# Patient Record
Sex: MALE | Race: WHITE | NOT HISPANIC OR LATINO | Employment: FULL TIME | URBAN - METROPOLITAN AREA
[De-identification: names, ages, dates, MRNs, and addresses within clinical notes are randomized per-mention and may not be internally consistent; named-entity substitution may affect disease eponyms.]

---

## 2017-03-07 DIAGNOSIS — F32.0 MAJOR DEPRESSIVE DISORDER, SINGLE EPISODE, MILD (H): ICD-10-CM

## 2017-03-07 RX ORDER — CITALOPRAM HYDROBROMIDE 20 MG/1
20 TABLET ORAL DAILY
Qty: 90 TABLET | Refills: 0 | Status: CANCELLED | OUTPATIENT
Start: 2017-03-07

## 2017-03-07 NOTE — TELEPHONE ENCOUNTER
citalopram (CELEXA) 20 MG tablet         Last Written Prescription Date: 07/19/16  Last Fill Quantity: 90, # refills: 0  [place] [facility] [place]:  03/14/2016       Last PHQ-9 score on record=   PHQ-9 SCORE 7/19/2016   Total Score -   Total Score 2

## 2017-03-09 NOTE — TELEPHONE ENCOUNTER
Pt takes celexa for depression. Is also on Wellbutrin  mg qd. Pt should've ran out of celexa in 10/2016. Not sure whether pt is taking med continuously. Called pharmacy, last dispense on celexa was 11/20/16 for 90#, no more refill left.  weaned him off from Celexa, but wanted to restart celexa,  agreed, advised him to make a f/u appointment in a month. Pt never made that f/u appointment. Pt is over due for depression f/u. Will call pt tomorrow to make an appointment & update PHQ.     Pt wanted to restart celexa - notes from 07/19/16:  Please call:  OK to restart citalopram. Rx sent to [facility].  I recommend that he see me in about 1 month for follow-up.     Notes from 01/13/16:  Try weaning off citalopram (Celexa)  For the next week, alternate 1 tablet with 1/2 tablet  The following week, 1/2 tablet once per day  The following week, 1/2 tablet every other day then stop      If your mood worsens over the next several weeks, please let me know     Jackie, RN  Triage Nurse

## 2017-03-09 NOTE — TELEPHONE ENCOUNTER
Called pt at [phone], advised that he need to be seen for depression f/u, offered an appointment. Pt says that he can't afford an OV at this time. Unable to update PHQ over the phone, he was outside. Made phone visit with  for tomorrow to f/u on depression meds & update PHQ.     Jackie, RN  Triage Nurse

## 2017-03-10 ENCOUNTER — VIRTUAL VISIT (OUTPATIENT)
Dept: PEDIATRICS | Facility: CLINIC | Age: 37
End: 2017-03-10
Payer: COMMERCIAL

## 2017-03-10 DIAGNOSIS — I10 ESSENTIAL HYPERTENSION, BENIGN: ICD-10-CM

## 2017-03-10 DIAGNOSIS — F32.0 MAJOR DEPRESSIVE DISORDER, SINGLE EPISODE, MILD (H): ICD-10-CM

## 2017-03-10 PROCEDURE — 99441 ZZC PHYSICIAN TELEPHONE EVALUATION 5-10 MIN: CPT | Performed by: INTERNAL MEDICINE

## 2017-03-10 RX ORDER — LISINOPRIL 20 MG/1
20 TABLET ORAL DAILY
Qty: 90 TABLET | Refills: 1 | Status: SHIPPED | OUTPATIENT
Start: 2017-03-10 | End: 2017-12-13

## 2017-03-10 RX ORDER — CITALOPRAM HYDROBROMIDE 20 MG/1
20 TABLET ORAL DAILY
Qty: 90 TABLET | Refills: 3 | Status: SHIPPED | OUTPATIENT
Start: 2017-03-10 | End: 2017-12-13

## 2017-03-10 RX ORDER — BUPROPION HYDROCHLORIDE 300 MG/1
300 TABLET ORAL EVERY MORNING
Qty: 90 TABLET | Refills: 3 | Status: SHIPPED | OUTPATIENT
Start: 2017-03-10 | End: 2017-12-13

## 2017-03-10 NOTE — PROGRESS NOTES
CC: Depression, HTN      HPI: Telephone visit today for Octavio Pereira for discussion of depression.    Has been taking citalopram and bupropion.  Feels that the medications are working well.  No significant side effects noted.    PHQ-9 score today of 1.    Also has HTN.  BP Readings from Last 3 Encounters:   10/20/16 120/80   03/16/16 134/72   03/14/16 130/80     No cardiac sx such as CP, palpitations, PND, orthopnea, ALBA or peripheral edema.     Patient Active Problem List   Diagnosis     GYNECOMASTIA     Hypertension     Mild Depression     CARDIOVASCULAR SCREENING; LDL GOAL LESS THAN 160     Blunt injury, left eye     JOSÉ MIGUEL (obstructive sleep apnea)     S/P cervical discectomy       Past Medical History   Diagnosis Date     BENIGN HYPERTENSION 6/6/2007     DEPRESSIVE DISORDER NEC 6/6/2007     Sleep apnea        Past Surgical History   Procedure Laterality Date     Surgical history of -        Thumb tendon repair     Orthopedic surgery       thumb tendon repair     Foraminotomy cervical posterior minimally invasive one level Left 3/15/2016     Procedure: FORAMINOTOMY CERVICAL POSTERIOR MINIMALLY INVASIVE ONE LEVEL;  Surgeon: Reza Garvin MD;  Location:  OR       Current Outpatient Prescriptions   Medication Sig Dispense Refill     lisinopril (PRINIVIL,ZESTRIL) 20 MG tablet Take 1 tablet (20 mg) by mouth daily 90 tablet 1     citalopram (CELEXA) 20 MG tablet Take 1 tablet (20 mg) by mouth daily 90 tablet 0     buPROPion (WELLBUTRIN XL) 300 MG 24 hr tablet Take 1 tablet (300 mg) by mouth every morning 90 tablet 3       ALLERGIES:  Allergies   Allergen Reactions     Sulfa Drugs Rash       EXAM  Not done d/t phone visit     ASSESSMENT:    ICD-10-CM    1. Major depressive disorder, single episode, mild (H) F32.0 citalopram (CELEXA) 20 MG tablet     buPROPion (WELLBUTRIN XL) 300 MG 24 hr tablet   2. BENIGN HYPERTENSION I10 lisinopril (PRINIVIL/ZESTRIL) 20 MG tablet     Continue w/ current medications.  Follow-up in  clinic this fall for BP recheck.     Total phone time: 5 minutes 20 seconds

## 2017-03-10 NOTE — MR AVS SNAPSHOT
"              After Visit Summary   3/10/2017    Octavio Pereira    MRN: [id number]           Patient Information     Date Of Birth          1980        Visit Information        Provider Department      3/10/2017 2:00 PM Francis Morales MD [facility] [place]        Today's Diagnoses     Major depressive disorder, single episode, mild (H)        BENIGN HYPERTENSION           Follow-ups after your visit        Who to contact     If you have questions or need follow up information about today's clinic visit or your schedule please contact [facility] directly at [phone].  Normal or non-critical lab and imaging results will be communicated to you by [employer], letter or phone within 4 business days after the clinic has received the results. If you do not hear from us within 7 days, please contact the clinic through [employer] or phone. If you have a critical or abnormal lab result, we will notify you by phone as soon as possible.  Submit refill requests through [employer] or call your pharmacy and they will forward the refill request to us. Please allow 3 business days for your refill to be completed.          Additional Information About Your Visit        MyChart Information     [employer] lets you send messages to your doctor, view your test results, renew your prescriptions, schedule appointments and more. To sign up, go to [email or web address] . Click on \"Log in\" on the left side of the screen, which will take you to the Welcome page. Then click on \"Sign up Now\" on the right side of the page.     You will be asked to enter the access code listed below, as well as some personal information. Please follow the directions to create your username and password.     Your access code is: [email or web address]  Expires: 2017  7:40 AM     Your access code will  in 90 days. If you need help or a new code, please call your [facility] or [phone].        Care EveryWhere ID     This is your Care " EveryWhere ID. This could be used by other organizations to access your [facility] medical records  [id number]         Blood Pressure from Last 3 Encounters:   10/20/16 120/80   03/16/16 134/72   03/14/16 130/80    Weight from Last 3 Encounters:   10/20/16 228 lb 6.4 oz (103.6 kg)   03/15/16 227 lb (103 kg)   03/14/16 228 lb (103.4 kg)              Today, you had the following     No orders found for display         Where to get your medicines      These medications were sent to [id number] - [place], [place] - [address]  [address], [facility] [address]     Phone:  [phone]     buPROPion 300 MG 24 hr tablet    citalopram 20 MG tablet    lisinopril 20 MG tablet          Primary Care Provider Office Phone # Fax #    Francis Morales [phone]       [facility] [address] [place] [address]        Thank you!     Thank you for choosing [facility]  for your care. Our goal is always to provide you with excellent care. Hearing back from our patients is one way we can continue to improve our services. Please take a few minutes to complete the written survey that you may receive in the mail after your visit with us. Thank you!             Your Updated Medication List - Protect others around you: Learn how to safely use, store and throw away your medicines at www.disposemymeds.org.          This list is accurate as of: 3/10/17 11:59 PM.  Always use your most recent med list.                   Brand Name Dispense Instructions for use    buPROPion 300 MG 24 hr tablet    WELLBUTRIN XL    90 tablet    Take 1 tablet (300 mg) by mouth every morning       citalopram 20 MG tablet    celeXA    90 tablet    Take 1 tablet (20 mg) by mouth daily       lisinopril 20 MG tablet    PRINIVIL/ZESTRIL    90 tablet    Take 1 tablet (20 mg) by mouth daily

## 2017-03-11 ASSESSMENT — PATIENT HEALTH QUESTIONNAIRE - PHQ9: SUM OF ALL RESPONSES TO PHQ QUESTIONS 1-9: 1

## 2017-10-15 ENCOUNTER — OFFICE VISIT (OUTPATIENT)
Dept: URGENT CARE | Facility: URGENT CARE | Age: 37
End: 2017-10-15
Payer: COMMERCIAL

## 2017-10-15 VITALS
OXYGEN SATURATION: 98 % | TEMPERATURE: 97.8 F | HEART RATE: 90 BPM | DIASTOLIC BLOOD PRESSURE: 60 MMHG | SYSTOLIC BLOOD PRESSURE: 110 MMHG

## 2017-10-15 DIAGNOSIS — J20.9 ACUTE BRONCHITIS WITH COEXISTING CONDITION REQUIRING PROPHYLACTIC TREATMENT: ICD-10-CM

## 2017-10-15 DIAGNOSIS — J11.1 INFLUENZA-LIKE ILLNESS: Primary | ICD-10-CM

## 2017-10-15 PROCEDURE — 99213 OFFICE O/P EST LOW 20 MIN: CPT | Performed by: FAMILY MEDICINE

## 2017-10-15 RX ORDER — DOXYCYCLINE 100 MG/1
100 CAPSULE ORAL 2 TIMES DAILY
Qty: 20 CAPSULE | Refills: 0 | Status: SHIPPED | OUTPATIENT
Start: 2017-10-15 | End: 2017-10-25

## 2017-10-15 NOTE — NURSING NOTE
"Chief Complaint   Patient presents with     Urgent Care     URI     Dx maikel-influencia on 10/10 at [facility]-  Cough, Cx congestion, ratllinng noise on Cx        Initial /60 (BP Location: Right arm, Patient Position: Chair, Cuff Size: Adult Large)  Pulse 90  Temp 97.8  F (36.6  C) (Oral)  SpO2 98% Estimated body mass index is 27.08 kg/(m^2) as calculated from the following:    Height as of 10/20/16: 6' 5\" (1.956 m).    Weight as of 10/20/16: 228 lb 6.4 oz (103.6 kg).  Medication Reconciliation: complete     Lyudmila Mckenzie CMA (AAMA)        "

## 2017-10-15 NOTE — PATIENT INSTRUCTIONS
Acute Bronchitis  Your healthcare provider has told you that you have acute bronchitis. Bronchitis is infection or inflammation of the bronchial tubes (airways in the lungs). Normally, air moves easily in and out of the airways. Bronchitis narrows the airways, making it harder for air to flow in and out of the lungs. This causes symptoms such as shortness of breath, coughing up yellow or green mucus, and wheezing. Bronchitis can be acute or chronic. Acute means the condition comes on quickly and goes away in a short time, usually within 3 to 10 days. Chronic means a condition lasts a long time and often comes back.    What causes acute bronchitis?  Acute bronchitis almost always starts as a viral respiratory infection, such as a cold or the flu. Certain factors make it more likely for a cold or flu to turn into bronchitis. These include being very young, being elderly, having a heart or lung problem, or having a weak immune system. Cigarette smoking also makes bronchitis more likely.  When bronchitis develops, the airways become swollen. The airways may also become infected with bacteria. This is known as a secondary infection.  Diagnosing acute bronchitis  Your healthcare provider will examine you and ask about your symptoms and health history. You may also have a sputum culture to test the fluid in your lungs. Chest X-rays may be done to look for infection in the lungs.  Treating acute bronchitis  Bronchitis usually clears up as the cold or flu goes away. You can help feel better faster by doing the following:    Take medicine as directed. You may be told to take ibuprofen or other over-the-counter medicines. These help relieve inflammation in your bronchial tubes. Your healthcare provider may prescribe an inhaler to help open up the bronchial tubes. Most of the time, acute bronchitis is caused by a viral infection. Antibiotics are usually not prescribed for viral infections.    Drink plenty of fluids, such as  water, juice, or warm soup. Fluids loosen mucus so that you can cough it up. This helps you breathe more easily. Fluids also prevent dehydration.    Make sure you get plenty of rest.    Do not smoke. Do not allow anyone else to smoke in your home.  Recovery and follow-up  Follow up with your doctor as you are told. You will likely feel better in a week or two. But a dry cough can linger beyond that time. Let your doctor know if you still have symptoms (other than a dry cough) after 2 weeks, or if you re prone to getting bronchial infections. Take steps to protect yourself from future infections. These steps include stopping smoking and avoiding tobacco smoke, washing your hands often, and getting a yearly flu shot.  When to call your healthcare provider  Call the healthcare provider if you have any of the following:    Fever of 100.4 F (38.0 C) or higher, or as advised    Symptoms that get worse, or new symptoms    Trouble breathing    Symptoms that don t start to improve within a week, or within 3 days of taking antibiotics   Date Last Reviewed: 12/1/2016 2000-2017 [employer]. [address] [place] [address]. All rights reserved. This information is not intended as a substitute for professional medical care. Always follow your healthcare professional's instructions.        The Flu (Influenza)     The virus that causes the flu spreads through the air in droplets when someone who has the flu coughs, sneezes, laughs, or talks.   The flu (influenza) is an infection that affects your respiratory tract. This tract is made up of your mouth, nose, and lungs, and the passages between them. Unlike a cold, the flu can make you very ill. And it can lead to pneumonia, a serious lung infection. The flu can have serious complications and even cause death.  Who is at risk for the flu?  Anyone can get the flu. But you are more likely to become infected if you:    Have a weakened immune system    Work in  a healthcare setting where you may be exposed to flu germs    Live or work with someone who has the flu    Haven t had an annual flu shot  How does the flu spread?  The flu is caused by a virus. The virus spreads through the air in droplets when someone who has the flu coughs, sneezes, laughs, or talks. You can become infected when you inhale these viruses directly. You can also become infected when you touch a surface on which the droplets have landed and then transfer the germs to your eyes, nose, or mouth. Touching used tissues, or sharing utensils, drinking glasses, or a toothbrush from an infected person can expose you to flu viruses, too.  What are the symptoms of the flu?  Flu symptoms tend to come on quickly and may last a few days to a few weeks. They include:    Fever usually higher than 100.4 F  (38 C) and chills    Sore throat and headache    Dry cough    Runny nose    Tiredness and weakness    Muscle aches  Who is at risk for flu complications?  For some people, the flu can be very serious. The risk for complications is greater for:    Children younger than age 5    Adults ages 65 and older    People with a chronic illness such as diabetes or heart, kidney, or lung disease    People who live in a nursing home or long-term care facility   How is the flu treated?  The flu usually gets better after 7 days or so. In some cases, your healthcare provider may prescribe an antiviral medicine. This may help you get well a little sooner. For the medicine to help, you need to take it as soon as possible (ideally within 48 hours) after your symptoms start. If you develop pneumonia or other serious illness, you may need to stay in the hospital.  Easing flu symptoms    Drink lots of fluids such as water, juice, and warm soup. A good rule is to drink enough so that you urinate your normal amount.    Get plenty of rest.    Ask your healthcare provider what to take for fever and pain.    Call your provider if your fever  is 100.4 F (38 C) or higher, or you become dizzy, lightheaded, or short of breath.  Taking steps to protect others    Wash your hands often, especially after coughing or sneezing. Or clean your hands with an alcohol-based hand  containing at least 60% alcohol.    Cough or sneeze into a tissue. Then throw the tissue away and wash your hands. If you don t have a tissue, cough and sneeze into your elbow.    Stay home until at least 24 hours after you no longer have a fever or chills. Be sure the fever isn t being hidden by fever-reducing medicine.    Don t share food, utensils, drinking glasses, or a toothbrush with others.    Ask your healthcare provider if others in your household should get antiviral medicine to help them avoid infection.  How can the flu be prevented?    One of the best ways to avoid the flu is to get a flu vaccine each year. The virus that causes the flu changes from year to year. For that reason, healthcare providers recommend getting the flu vaccine each year, as soon as it's available in your area. The vaccine is given as a shot. Your healthcare provider can tell you which vaccine is right for you. A nasal spray is also available but is not recommended for the 1086-6806 flu season. The CDC says this is because the nasal spray did not seem to protect against the flu over the last several flu seasons. In the past, it was meant for people ages 2 to 49.    Wash your hands often. Frequent handwashing is a proven way to help prevent infection.    Carry an alcohol-based hand gel containing at least 60% alcohol. Use it when you can't use soap and water. Then wash your hands as soon as you can.    Avoid touching your eyes, nose, and mouth.    At home and work, clean phones, computer keyboards, and toys often with disinfectant wipes.    If possible, avoid close contact with others who have the flu or symptoms of the flu.  Handwashing tips  Handwashing is one of the best ways to prevent many common  infections. If you are caring for or visiting someone with the flu, wash your hands each time you enter and leave the room. Follow these steps:    Use warm water and plenty of soap. Rub your hands together well.    Clean the whole hand, including under your nails, between your fingers, and up the wrists.    Wash for at least 15 seconds.    Rinse, letting the water run down your fingers, not up your wrists.    Dry your hands well. Use a paper towel to turn off the faucet and open the door.  Using alcohol-based hand   Alcohol-based hand  are also a good choice. Use them when you can't use soap and water. Follow these steps:    Squeeze about a tablespoon of gel into the palm of one hand.    Rub your hands together briskly, cleaning the backs of your hands, the palms, between your fingers, and up the wrists.    Rub until the gel is gone and your hands are completely dry.  Preventing the flu in healthcare settings  The flu is a special concern for people in hospitals and long-term care facilities. To help prevent the spread of flu, many hospitals and nursing homes take these steps:    Healthcare providers wash their hands or use an alcohol-based hand  before and after treating each patient.    People with the flu have private rooms and bathrooms or share a room with someone with the same infection.    People who are at high risk for the flu but don't have it are encouraged to get the flu and pneumonia vaccines.    All healthcare workers are encouraged or required to get flu shots.   Date Last Reviewed: 12/1/2016 2000-2017 [employer]. [address] [place] [address], [place] [address]. All rights reserved. This information is not intended as a substitute for professional medical care. Always follow your healthcare professional's instructions.

## 2017-10-15 NOTE — MR AVS SNAPSHOT
After Visit Summary   10/15/2017    Octavio Pereira    MRN: [id number]           Patient Information     Date Of Birth          1980        Visit Information        Provider Department      10/15/2017 10:25 AM Izabela Phillips MD [facility]        Today's Diagnoses     Influenza-like illness    -  1    Acute bronchitis with coexisting condition requiring prophylactic treatment          Care Instructions      Acute Bronchitis  Your healthcare provider has told you that you have acute bronchitis. Bronchitis is infection or inflammation of the bronchial tubes (airways in the lungs). Normally, air moves easily in and out of the airways. Bronchitis narrows the airways, making it harder for air to flow in and out of the lungs. This causes symptoms such as shortness of breath, coughing up yellow or green mucus, and wheezing. Bronchitis can be acute or chronic. Acute means the condition comes on quickly and goes away in a short time, usually within 3 to 10 days. Chronic means a condition lasts a long time and often comes back.    What causes acute bronchitis?  Acute bronchitis almost always starts as a viral respiratory infection, such as a cold or the flu. Certain factors make it more likely for a cold or flu to turn into bronchitis. These include being very young, being elderly, having a heart or lung problem, or having a weak immune system. Cigarette smoking also makes bronchitis more likely.  When bronchitis develops, the airways become swollen. The airways may also become infected with bacteria. This is known as a secondary infection.  Diagnosing acute bronchitis  Your healthcare provider will examine you and ask about your symptoms and health history. You may also have a sputum culture to test the fluid in your lungs. Chest X-rays may be done to look for infection in the lungs.  Treating acute bronchitis  Bronchitis usually clears up as the cold or flu goes away. You can help  feel better faster by doing the following:    Take medicine as directed. You may be told to take ibuprofen or other over-the-counter medicines. These help relieve inflammation in your bronchial tubes. Your healthcare provider may prescribe an inhaler to help open up the bronchial tubes. Most of the time, acute bronchitis is caused by a viral infection. Antibiotics are usually not prescribed for viral infections.    Drink plenty of fluids, such as water, juice, or warm soup. Fluids loosen mucus so that you can cough it up. This helps you breathe more easily. Fluids also prevent dehydration.    Make sure you get plenty of rest.    Do not smoke. Do not allow anyone else to smoke in your home.  Recovery and follow-up  Follow up with your doctor as you are told. You will likely feel better in a week or two. But a dry cough can linger beyond that time. Let your doctor know if you still have symptoms (other than a dry cough) after 2 weeks, or if you re prone to getting bronchial infections. Take steps to protect yourself from future infections. These steps include stopping smoking and avoiding tobacco smoke, washing your hands often, and getting a yearly flu shot.  When to call your healthcare provider  Call the healthcare provider if you have any of the following:    Fever of 100.4 F (38.0 C) or higher, or as advised    Symptoms that get worse, or new symptoms    Trouble breathing    Symptoms that don t start to improve within a week, or within 3 days of taking antibiotics   Date Last Reviewed: 12/1/2016 2000-2017 [employer]. [address] [place] [address], [place] [address]. All rights reserved. This information is not intended as a substitute for professional medical care. Always follow your healthcare professional's instructions.        The Flu (Influenza)     The virus that causes the flu spreads through the air in droplets when someone who has the flu coughs, sneezes, laughs, or talks.   The flu (influenza)  is an infection that affects your respiratory tract. This tract is made up of your mouth, nose, and lungs, and the passages between them. Unlike a cold, the flu can make you very ill. And it can lead to pneumonia, a serious lung infection. The flu can have serious complications and even cause death.  Who is at risk for the flu?  Anyone can get the flu. But you are more likely to become infected if you:    Have a weakened immune system    Work in a healthcare setting where you may be exposed to flu germs    Live or work with someone who has the flu    Haven t had an annual flu shot  How does the flu spread?  The flu is caused by a virus. The virus spreads through the air in droplets when someone who has the flu coughs, sneezes, laughs, or talks. You can become infected when you inhale these viruses directly. You can also become infected when you touch a surface on which the droplets have landed and then transfer the germs to your eyes, nose, or mouth. Touching used tissues, or sharing utensils, drinking glasses, or a toothbrush from an infected person can expose you to flu viruses, too.  What are the symptoms of the flu?  Flu symptoms tend to come on quickly and may last a few days to a few weeks. They include:    Fever usually higher than 100.4 F  (38 C) and chills    Sore throat and headache    Dry cough    Runny nose    Tiredness and weakness    Muscle aches  Who is at risk for flu complications?  For some people, the flu can be very serious. The risk for complications is greater for:    Children younger than age 5    Adults ages 65 and older    People with a chronic illness such as diabetes or heart, kidney, or lung disease    People who live in a nursing home or long-term care facility   How is the flu treated?  The flu usually gets better after 7 days or so. In some cases, your healthcare provider may prescribe an antiviral medicine. This may help you get well a little sooner. For the medicine to help, you need  to take it as soon as possible (ideally within 48 hours) after your symptoms start. If you develop pneumonia or other serious illness, you may need to stay in the hospital.  Easing flu symptoms    Drink lots of fluids such as water, juice, and warm soup. A good rule is to drink enough so that you urinate your normal amount.    Get plenty of rest.    Ask your healthcare provider what to take for fever and pain.    Call your provider if your fever is 100.4 F (38 C) or higher, or you become dizzy, lightheaded, or short of breath.  Taking steps to protect others    Wash your hands often, especially after coughing or sneezing. Or clean your hands with an alcohol-based hand  containing at least 60% alcohol.    Cough or sneeze into a tissue. Then throw the tissue away and wash your hands. If you don t have a tissue, cough and sneeze into your elbow.    Stay home until at least 24 hours after you no longer have a fever or chills. Be sure the fever isn t being hidden by fever-reducing medicine.    Don t share food, utensils, drinking glasses, or a toothbrush with others.    Ask your healthcare provider if others in your household should get antiviral medicine to help them avoid infection.  How can the flu be prevented?    One of the best ways to avoid the flu is to get a flu vaccine each year. The virus that causes the flu changes from year to year. For that reason, healthcare providers recommend getting the flu vaccine each year, as soon as it's available in your area. The vaccine is given as a shot. Your healthcare provider can tell you which vaccine is right for you. A nasal spray is also available but is not recommended for the 1555-3637 flu season. The CDC says this is because the nasal spray did not seem to protect against the flu over the last several flu seasons. In the past, it was meant for people ages 2 to 49.    Wash your hands often. Frequent handwashing is a proven way to help prevent infection.    Carry  an alcohol-based hand gel containing at least 60% alcohol. Use it when you can't use soap and water. Then wash your hands as soon as you can.    Avoid touching your eyes, nose, and mouth.    At home and work, clean phones, computer keyboards, and toys often with disinfectant wipes.    If possible, avoid close contact with others who have the flu or symptoms of the flu.  Handwashing tips  Handwashing is one of the best ways to prevent many common infections. If you are caring for or visiting someone with the flu, wash your hands each time you enter and leave the room. Follow these steps:    Use warm water and plenty of soap. Rub your hands together well.    Clean the whole hand, including under your nails, between your fingers, and up the wrists.    Wash for at least 15 seconds.    Rinse, letting the water run down your fingers, not up your wrists.    Dry your hands well. Use a paper towel to turn off the faucet and open the door.  Using alcohol-based hand   Alcohol-based hand  are also a good choice. Use them when you can't use soap and water. Follow these steps:    Squeeze about a tablespoon of gel into the palm of one hand.    Rub your hands together briskly, cleaning the backs of your hands, the palms, between your fingers, and up the wrists.    Rub until the gel is gone and your hands are completely dry.  Preventing the flu in healthcare settings  The flu is a special concern for people in hospitals and long-term care facilities. To help prevent the spread of flu, many hospitals and nursing homes take these steps:    Healthcare providers wash their hands or use an alcohol-based hand  before and after treating each patient.    People with the flu have private rooms and bathrooms or share a room with someone with the same infection.    People who are at high risk for the flu but don't have it are encouraged to get the flu and pneumonia vaccines.    All healthcare workers are encouraged or  "required to get flu shots.   Date Last Reviewed: 2016-2017 [employer], "[employer]. [address] [place] [address], [place] [address]. All rights reserved. This information is not intended as a substitute for professional medical care. Always follow your healthcare professional's instructions.                Follow-ups after your visit        Who to contact     If you have questions or need follow up information about today's clinic visit or your schedule please contact [facility] directly at [phone].  Normal or non-critical lab and imaging results will be communicated to you by BitInstanthart, letter or phone within 4 business days after the clinic has received the results. If you do not hear from us within 7 days, please contact the clinic through RV IDt or phone. If you have a critical or abnormal lab result, we will notify you by phone as soon as possible.  Submit refill requests through "[employer]" or call your pharmacy and they will forward the refill request to us. Please allow 3 business days for your refill to be completed.          Additional Information About Your Visit        BitInstantharMTX Connect Information     "[employer]" lets you send messages to your doctor, view your test results, renew your prescriptions, schedule appointments and more. To sign up, go to [email or web address]/"[employer]" . Click on \"Log in\" on the left side of the screen, which will take you to the Welcome page. Then click on \"Sign up Now\" on the right side of the page.     You will be asked to enter the access code listed below, as well as some personal information. Please follow the directions to create your username and password.     Your access code is: [email or web address]  Expires: 2018 11:21 AM     Your access code will  in 90 days. If you need help or a new code, please call your [facility] or [phone].        Care EveryWhere ID     This is your Care EveryWhere ID. This could be used by other organizations to access " your [place] medical records  [id number]        Your Vitals Were     Pulse Temperature Pulse Oximetry             90 97.8  F (36.6  C) (Oral) 98%          Blood Pressure from Last 3 Encounters:   10/15/17 110/60   10/20/16 120/80   03/16/16 134/72    Weight from Last 3 Encounters:   10/20/16 228 lb 6.4 oz (103.6 kg)   03/15/16 227 lb (103 kg)   03/14/16 228 lb (103.4 kg)              Today, you had the following     No orders found for display         Today's Medication Changes          These changes are accurate as of: 10/15/17 11:21 AM.  If you have any questions, ask your nurse or doctor.               Start taking these medicines.        Dose/Directions    doxycycline 100 MG capsule   Commonly known as:  VIBRAMYCIN   Used for:  Acute bronchitis with coexisting condition requiring prophylactic treatment, Influenza-like illness   Started by:  Izabela Phillips MD        Dose:  100 mg   Take 1 capsule (100 mg) by mouth 2 times daily for 10 days   Quantity:  20 capsule   Refills:  0            Where to get your medicines      These medications were sent to [id number] - [place], [place] - [address]  [address], [facility] [address]     Phone:  [phone]     doxycycline 100 MG capsule                Primary Care Provider Office Phone # Fax #    Francis Morales [phone] [address] [place] [address]        Equal Access to Services     [facility] AH: Hadii mayela solo hadasho Socandidaali, waaxda luqadaha, qaybta kaalmada adeegyada, waxángel herb chapin. So [facility] [phone].    ATENCIÓN: Si habla español, tiene a domínguez disposición servicios gratuitos de asistencia lingüística. Llame al [phone].    We comply with applicable federal civil rights laws and [place] laws. We do not discriminate on the basis of race, color, national origin, age, disability, sex, sexual orientation, or gender identity.            Thank you!     Thank you for  choosing [facility]RG[facility]  for your care. Our goal is always to provide you with excellent care. Hearing back from our patients is one way we can continue to improve our services. Please take a few minutes to complete the written survey that you may receive in the mail after your visit with us. Thank you!             Your Updated Medication List - Protect others around you: Learn how to safely use, store and throw away your medicines at www.disposemymeds.org.          This list is accurate as of: 10/15/17 11:21 AM.  Always use your most recent med list.                   Brand Name Dispense Instructions for use Diagnosis    buPROPion 300 MG 24 hr tablet    WELLBUTRIN XL    90 tablet    Take 1 tablet (300 mg) by mouth every morning    Major depressive disorder, single episode, mild (H)       citalopram 20 MG tablet    celeXA    90 tablet    Take 1 tablet (20 mg) by mouth daily    Major depressive disorder, single episode, mild (H)       doxycycline 100 MG capsule    VIBRAMYCIN    20 capsule    Take 1 capsule (100 mg) by mouth 2 times daily for 10 days    Acute bronchitis with coexisting condition requiring prophylactic treatment, Influenza-like illness       lisinopril 20 MG tablet    PRINIVIL/ZESTRIL    90 tablet    Take 1 tablet (20 mg) by mouth daily    Essential hypertension, benign

## 2017-10-15 NOTE — PROGRESS NOTES
SUBJECTIVE:   Chief Complaint   Patient presents with     Urgent Care     URI     [facility]  Cough, Cx congestion, ratllinng noise on Cx      Octavio Pereira is a 37 year old male who present complaining of flu-like symptoms: fevers, chills, myalgias, headache,  congestion, sore throat and cough for 5 days.  He has been home in bed with alternating fevers/ chills.    Reports some  dyspnea /  no wheezing.  Has no known exposure to people with influenza.  The fevers stopped 2 days ago, but now he has worse cough, lung burning sensation, fatigue, no wheezing-  Concerned about lung infection      Past Medical History:   Diagnosis Date     BENIGN HYPERTENSION 6/6/2007     DEPRESSIVE DISORDER NEC 6/6/2007     Sleep apnea        ALLERGIES:  Sulfa drugs        Current Outpatient Prescriptions on File Prior to Visit:  citalopram (CELEXA) 20 MG tablet Take 1 tablet (20 mg) by mouth daily   buPROPion (WELLBUTRIN XL) 300 MG 24 hr tablet Take 1 tablet (300 mg) by mouth every morning   lisinopril (PRINIVIL/ZESTRIL) 20 MG tablet Take 1 tablet (20 mg) by mouth daily     No current facility-administered medications on file prior to visit.     Social History   Substance Use Topics     Smoking status: Never Smoker     Smokeless tobacco: Never Used     Alcohol use 0.0 oz/week     0 Standard drinks or equivalent per week      Comment: socially       Family History   Problem Relation Age of Onset     C.A.D. Father      C.A.D. Maternal Grandmother      DIABETES Father      Depression Father          ROS:  INTEGUMENTARY/SKIN: NEGATIVE for worrisome rashes, moles or lesions  EYES: NEGATIVE for vision changes or irritation  ENT/MOUTH: NEGATIVE for ear, mouth and throat problems  GI: NEGATIVE for nausea, abdominal pain, heartburn, or change in bowel habits     OBJECTIVE  :/60 (BP Location: Right arm, Patient Position: Chair, Cuff Size: Adult Large)  Pulse 90  Temp 97.8  F (36.6  C) (Oral)  SpO2  98%  GENERAL APPEARANCE: alert, moderate distress, flushed and fatigued,  occasional cough  EYES: EOMI,  PERRL, conjunctiva clear  HENT: ear canals and TM's normal.  Nose and mouth without ulcers, erythema or lesions  NECK: supple, nontender, no lymphadenopathy  RESP: lungs clear to auscultation - no rales, rhonchi or wheezes  CV: regular rates and rhythm, normal S1 S2, no murmur noted  NEURO: Normal strength and tone, sensory exam grossly normal,  normal speech and mentation  SKIN: no suspicious lesions or rashes     ASSESSMENT:  Influenza-like illness     - doxycycline (VIBRAMYCIN) 100 MG capsule; Take 1 capsule (100 mg) by mouth 2 times daily for 10 days    Acute bronchitis with coexisting condition requiring prophylactic treatment     - doxycycline (VIBRAMYCIN) 100 MG capsule; Take 1 capsule (100 mg) by mouth 2 times daily for 10 days        Symptomatic therapy suggested:  Rest, drink lots of fluids,  Acetaminophen / ibuprofen for body aches and fever,  Salt water gargles for sore throat,  OTC anesthetic lozenges for sore throat,  OTC cough medications.   Call or return to clinic prn if these symptoms worsen or fail to improve as anticipated.     Advised that influenza illness usually lasts a week, sometimes more and that the patient should avoid contact with others, and cover the mouth when coughing to limit spread of the infection.    Discussed that influenza is a potent virus that can cause damage to airways making increased risk for developing bronchitis or pneumonia.  In severe cases of chest symptoms and antibiotic can treat the bacterial superinfection, but I explained that the antibiotic is not effective against the influenza virus.

## 2017-12-13 ENCOUNTER — OFFICE VISIT (OUTPATIENT)
Dept: PEDIATRICS | Facility: CLINIC | Age: 37
End: 2017-12-13
Payer: COMMERCIAL

## 2017-12-13 VITALS
WEIGHT: 232.8 LBS | SYSTOLIC BLOOD PRESSURE: 122 MMHG | BODY MASS INDEX: 27.49 KG/M2 | HEART RATE: 86 BPM | OXYGEN SATURATION: 97 % | TEMPERATURE: 98.3 F | HEIGHT: 77 IN | DIASTOLIC BLOOD PRESSURE: 80 MMHG

## 2017-12-13 DIAGNOSIS — F32.0 MAJOR DEPRESSIVE DISORDER, SINGLE EPISODE, MILD (H): ICD-10-CM

## 2017-12-13 DIAGNOSIS — G25.81 RESTLESS LEGS: ICD-10-CM

## 2017-12-13 DIAGNOSIS — Z23 NEED FOR PROPHYLACTIC VACCINATION AND INOCULATION AGAINST INFLUENZA: ICD-10-CM

## 2017-12-13 DIAGNOSIS — I10 ESSENTIAL HYPERTENSION, BENIGN: Primary | ICD-10-CM

## 2017-12-13 DIAGNOSIS — Z23 NEED FOR VACCINATION: ICD-10-CM

## 2017-12-13 LAB
ANION GAP SERPL CALCULATED.3IONS-SCNC: 7 MMOL/L (ref 3–14)
BUN SERPL-MCNC: 22 MG/DL (ref 7–30)
CALCIUM SERPL-MCNC: 8.8 MG/DL (ref 8.5–10.1)
CHLORIDE SERPL-SCNC: 106 MMOL/L (ref 94–109)
CO2 SERPL-SCNC: 26 MMOL/L (ref 20–32)
CREAT SERPL-MCNC: 1.04 MG/DL (ref 0.66–1.25)
FERRITIN SERPL-MCNC: 205 NG/ML (ref 26–388)
GFR SERPL CREATININE-BSD FRML MDRD: 80 ML/MIN/1.7M2
GLUCOSE SERPL-MCNC: 96 MG/DL (ref 70–99)
IRON SERPL-MCNC: 81 UG/DL (ref 35–180)
POTASSIUM SERPL-SCNC: 4 MMOL/L (ref 3.4–5.3)
SODIUM SERPL-SCNC: 139 MMOL/L (ref 133–144)

## 2017-12-13 PROCEDURE — 90686 IIV4 VACC NO PRSV 0.5 ML IM: CPT | Performed by: INTERNAL MEDICINE

## 2017-12-13 PROCEDURE — 80048 BASIC METABOLIC PNL TOTAL CA: CPT | Performed by: INTERNAL MEDICINE

## 2017-12-13 PROCEDURE — 90471 IMMUNIZATION ADMIN: CPT | Performed by: INTERNAL MEDICINE

## 2017-12-13 PROCEDURE — 83540 ASSAY OF IRON: CPT | Performed by: INTERNAL MEDICINE

## 2017-12-13 PROCEDURE — 99214 OFFICE O/P EST MOD 30 MIN: CPT | Mod: 25 | Performed by: INTERNAL MEDICINE

## 2017-12-13 PROCEDURE — 36415 COLL VENOUS BLD VENIPUNCTURE: CPT | Performed by: INTERNAL MEDICINE

## 2017-12-13 PROCEDURE — 82728 ASSAY OF FERRITIN: CPT | Performed by: INTERNAL MEDICINE

## 2017-12-13 RX ORDER — LISINOPRIL 20 MG/1
20 TABLET ORAL DAILY
Qty: 90 TABLET | Refills: 3 | Status: SHIPPED | OUTPATIENT
Start: 2017-12-13 | End: 2018-12-10

## 2017-12-13 RX ORDER — CITALOPRAM HYDROBROMIDE 20 MG/1
20 TABLET ORAL DAILY
Qty: 90 TABLET | Refills: 3 | Status: SHIPPED | OUTPATIENT
Start: 2017-12-13 | End: 2019-02-17

## 2017-12-13 RX ORDER — BUPROPION HYDROCHLORIDE 300 MG/1
300 TABLET ORAL EVERY MORNING
Qty: 90 TABLET | Refills: 3 | Status: SHIPPED | OUTPATIENT
Start: 2017-12-13 | End: 2019-02-17

## 2017-12-13 RX ORDER — ROPINIROLE 0.5 MG/1
0.5 TABLET, FILM COATED ORAL
Qty: 30 TABLET | Refills: 1 | Status: SHIPPED | OUTPATIENT
Start: 2017-12-13 | End: 2019-11-03

## 2017-12-13 ASSESSMENT — PATIENT HEALTH QUESTIONNAIRE - PHQ9: SUM OF ALL RESPONSES TO PHQ QUESTIONS 1-9: 1

## 2017-12-13 NOTE — PATIENT INSTRUCTIONS
Check labwork today   I will contact you with the results    Requip 0.5 mg   Take at bedtime as needed for restless leg symptoms    Next visit in about 1 year

## 2017-12-13 NOTE — PROGRESS NOTES
"  SUBJECTIVE:   Octavio Pereira is a 37 year old male who presents to clinic today for the following health issues:      Hypertension Follow-up      Outpatient blood pressures are not being checked.    Low Salt Diet: not monitoring salt    Depression Follow-up    Status since last visit: Improved     See PHQ-9 for current symptoms.  Other associated symptoms: None    Complicating factors:   Significant life event:  No   Current substance abuse:  None  Anxiety or Panic symptoms:  No    PHQ-9 SCORE 7/19/2016 3/10/2017 12/13/2017   Total Score - - -   Total Score 2 1 1       Concerns of  Restless legs at night-  When have less sleep due to working late nights   Most nights w/o sx.       Amount of exercise or physical activity: 2-3 days/week for an average of 15-30 minutes    Problems taking medications regularly: No    Medication side effects: none    Diet: regular (no restrictions)    Problem list and histories reviewed & adjusted, as indicated.    Labs reviewed in EPIC    Reviewed and updated as needed this visit by clinical staffTobacco  Allergies  Meds  Problems       Reviewed and updated as needed this visit by Provider  Allergies  Meds  Problems         ROS:  Constitutional, HEENT, cardiovascular, pulmonary, gi and gu systems are negative, except as otherwise noted.      OBJECTIVE:   /80  Pulse 86  Temp 98.3  F (36.8  C) (Oral)  Ht 6' 5\" (1.956 m)  Wt 232 lb 12.8 oz (105.6 kg)  SpO2 97%  BMI 27.61 kg/m2  Body mass index is 27.61 kg/(m^2).  GENERAL: healthy, alert and no distress  EYES: Eyes grossly normal to inspection, PERRL and conjunctivae and sclerae normal  HENT: ear canals and TM's normal, nose and mouth without ulcers or lesions  NECK: no adenopathy, no asymmetry, masses, or scars and thyroid normal to palpation  RESP: lungs clear to auscultation - no rales, rhonchi or wheezes  CV: regular rate and rhythm, normal S1 S2, no S3 or S4, no murmur, click or rub, no peripheral edema and " peripheral pulses strong  ABDOMEN: soft, nontender, no hepatosplenomegaly, no masses and bowel sounds normal  MS: no gross musculoskeletal defects noted, no edema  SKIN: no suspicious lesions or rashes  NEURO: Normal strength and tone, mentation intact and speech normal  PSYCH: mentation appears normal, affect normal/bright      ASSESSMENT/PLAN:       ICD-10-CM    1. BENIGN HYPERTENSION I10 lisinopril (PRINIVIL/ZESTRIL) 20 MG tablet     Basic metabolic panel   2. Major depressive disorder, single episode, mild (H) F32.0 citalopram (CELEXA) 20 MG tablet     buPROPion (WELLBUTRIN XL) 300 MG 24 hr tablet   3. Restless legs G25.81 Iron     Ferritin     rOPINIRole (REQUIP) 0.5 MG tablet   4. Need for vaccination Z23 CANCELED: C FLU VAC QUADRIVALENT SPLIT VIRUS 3+YRS IM   5. Need for prophylactic vaccination and inoculation against influenza Z23 FLU VAC, SPLIT VIRUS IM > 3 YO (QUADRIVALENT) [90274]     Vaccine Administration, Initial [19209]     Patient Instructions   Check labwork today   I will contact you with the results    Requip 0.5 mg   Take at bedtime as needed for restless leg symptoms    Next visit in about 1 year    Francis Morales MD  [facility]

## 2017-12-13 NOTE — LETTER
[address] [id number] [phone]   December 13, 2017    Octavio Pereira  [id number] [place] [id number]      Octavio:    Your tests are all complete. The results show:    1. Your metabolic panel, including kidney function, glucose (blood sugar) and electrolytes, is normal.     2. Your iron and your ferritin (iron stores) levels are normal. An iron supplement would likely not help your restless leg symptoms.     Hopefully the Requip helps!      Please feel free to contact me if you have any questions or concerns.      Francis Morales MD      Results for orders placed or performed in visit on 12/13/17   Basic metabolic panel   Result Value Ref Range    Sodium 139 133 - 144 mmol/L    Potassium 4.0 3.4 - 5.3 mmol/L    Chloride 106 94 - 109 mmol/L    Carbon Dioxide 26 20 - 32 mmol/L    Anion Gap 7 3 - 14 mmol/L    Glucose 96 70 - 99 mg/dL    Urea Nitrogen 22 7 - 30 mg/dL    Creatinine 1.04 0.66 - 1.25 mg/dL    GFR Estimate 80 >60 mL/min/1.7m2    GFR Estimate If Black >90 >60 mL/min/1.7m2    Calcium 8.8 8.5 - 10.1 mg/dL   Iron   Result Value Ref Range    Iron 81 35 - 180 ug/dL   Ferritin   Result Value Ref Range    Ferritin 205 26 - 388 ng/mL

## 2017-12-13 NOTE — NURSING NOTE
"Chief Complaint   Patient presents with     Medication Reconciliation       Initial /80  Pulse 86  Temp 98.3  F (36.8  C) (Oral)  Ht 6' 5\" (1.956 m)  Wt 232 lb 12.8 oz (105.6 kg)  SpO2 97%  BMI 27.61 kg/m2 Estimated body mass index is 27.61 kg/(m^2) as calculated from the following:    Height as of this encounter: 6' 5\" (1.956 m).    Weight as of this encounter: 232 lb 12.8 oz (105.6 kg).  Medication Reconciliation: complete   Holly Rivera MA// December 13, 2017 9:39 AM          "

## 2017-12-13 NOTE — PROGRESS NOTES
Injectable Influenza Immunization Documentation    1.  Is the person to be vaccinated sick today?   No    2. Does the person to be vaccinated have an allergy to a component   of the vaccine?   No  Egg Allergy Algorithm Link    3. Has the person to be vaccinated ever had a serious reaction   to influenza vaccine in the past?   No    4. Has the person to be vaccinated ever had Guillain-Barré syndrome?   No    Form completed by Holly Rivera MA// December 13, 2017 10:44 AM

## 2017-12-13 NOTE — MR AVS SNAPSHOT
"              After Visit Summary   12/13/2017    Octavio Pereira    MRN: [id number]           Patient Information     Date Of Birth          1980        Visit Information        Provider Department      12/13/2017 9:40 AM Francis Morales MD [facility]        Today's Diagnoses     BENIGN HYPERTENSION    -  1    Major depressive disorder, single episode, mild (H)        Restless legs          Care Instructions    Check labwork today   I will contact you with the results    Requip 0.5 mg   Take at bedtime as needed for restless leg symptoms    Next visit in about 1 year          Follow-ups after your visit        Who to contact     If you have questions or need follow up information about today's clinic visit or your schedule please contact [facility] directly at [phone].  Normal or non-critical lab and imaging results will be communicated to you by MyChart, letter or phone within 4 business days after the clinic has received the results. If you do not hear from us within 7 days, please contact the clinic through MyChart or phone. If you have a critical or abnormal lab result, we will notify you by phone as soon as possible.  Submit refill requests through "[employer] or call your pharmacy and they will forward the refill request to us. Please allow 3 business days for your refill to be completed.          Additional Information About Your Visit        MyChartheBench Information     "[employer] lets you send messages to your doctor, view your test results, renew your prescriptions, schedule appointments and more. To sign up, go to [email or web address] . Click on \"Log in\" on the left side of the screen, which will take you to the Welcome page. Then click on \"Sign up Now\" on the right side of the page.     You will be asked to enter the access code listed below, as well as some personal information. Please follow the directions to create your username and password.     Your access code is: " [email or web address]  Expires: 2018 10:21 AM     Your access code will  in 90 days. If you need help or a new code, please call your [facility] clinic or [phone].        Care EveryWhere ID     This is your Care EveryWhere ID. This could be used by other organizations to access your [facility] medical records  [id number]        Your Vitals Were     Pulse Temperature Height Pulse Oximetry BMI (Body Mass Index)       86 98.3  F (36.8  C) (Oral) 6' 5\" (1.956 m) 97% 27.61 kg/m2        Blood Pressure from Last 3 Encounters:   17 122/80   10/15/17 110/60   10/20/16 120/80    Weight from Last 3 Encounters:   17 232 lb 12.8 oz (105.6 kg)   10/20/16 228 lb 6.4 oz (103.6 kg)   03/15/16 227 lb (103 kg)              We Performed the Following     Basic metabolic panel     Ferritin     Iron          Today's Medication Changes          These changes are accurate as of: 17  9:53 AM.  If you have any questions, ask your nurse or doctor.               Start taking these medicines.        Dose/Directions    rOPINIRole 0.5 MG tablet   Commonly known as:  REQUIP   Used for:  Restless legs   Started by:  Francis Morales MD        Dose:  0.5 mg   Take 1 tablet (0.5 mg) by mouth nightly as needed   Quantity:  30 tablet   Refills:  1         These medicines have changed or have updated prescriptions.        Dose/Directions    lisinopril 20 MG tablet   Commonly known as:  PRINIVIL/ZESTRIL   This may have changed:  additional instructions   Used for:  Essential hypertension, benign   Changed by:  Francis Morales MD        Dose:  20 mg   Take 1 tablet (20 mg) by mouth daily   Quantity:  90 tablet   Refills:  3            Where to get your medicines      These medications were sent to [id number] [place] - [address]   [address], [place] [address]     Phone:  [phone]     buPROPion 300 MG 24 hr tablet    citalopram 20 MG tablet    lisinopril 20 MG tablet    rOPINIRole 0.5 MG tablet    "             Primary Care Provider Office Phone # Fax #    Francis Morales [phone] [address] [place] [address]        Equal Access to Services     CARMENCITA TIMMONSGUERA : Hadshasta mayela solo tishao Sobryce, waaxda luqadaha, qaybta kaalmada cathy, ashley deleon labernardfatmata tavares. So [facility] [phone].    ATENCIÓN: Si habla español, tiene a domínguez disposición servicios gratuitos de asistencia lingüística. Llame al [phone].    We comply with applicable federal civil rights laws and [place] laws. We do not discriminate on the basis of race, color, national origin, age, disability, sex, sexual orientation, or gender identity.            Thank you!     Thank you for choosing [facility]  for your care. Our goal is always to provide you with excellent care. Hearing back from our patients is one way we can continue to improve our services. Please take a few minutes to complete the written survey that you may receive in the mail after your visit with us. Thank you!             Your Updated Medication List - Protect others around you: Learn how to safely use, store and throw away your medicines at www.disposemymeds.org.          This list is accurate as of: 12/13/17  9:53 AM.  Always use your most recent med list.                   Brand Name Dispense Instructions for use Diagnosis    buPROPion 300 MG 24 hr tablet    WELLBUTRIN XL    90 tablet    Take 1 tablet (300 mg) by mouth every morning    Major depressive disorder, single episode, mild (H)       citalopram 20 MG tablet    celeXA    90 tablet    Take 1 tablet (20 mg) by mouth daily    Major depressive disorder, single episode, mild (H)       lisinopril 20 MG tablet    PRINIVIL/ZESTRIL    90 tablet    Take 1 tablet (20 mg) by mouth daily    Essential hypertension, benign       rOPINIRole 0.5 MG tablet    REQUIP    30 tablet    Take 1 tablet (0.5 mg) by mouth nightly as needed    Restless legs

## 2018-05-27 ENCOUNTER — HOSPITAL ENCOUNTER (EMERGENCY)
Facility: HOSPITAL | Age: 38
Discharge: HOME | End: 2018-05-28
Attending: FAMILY MEDICINE | Admitting: FAMILY MEDICINE
Payer: COMMERCIAL

## 2018-05-27 ENCOUNTER — APPOINTMENT (OUTPATIENT)
Dept: GENERAL RADIOLOGY | Facility: HOSPITAL | Age: 38
End: 2018-05-27
Attending: FAMILY MEDICINE
Payer: COMMERCIAL

## 2018-05-27 DIAGNOSIS — S91.111A LACERATION OF RIGHT GREAT TOE WITHOUT FOREIGN BODY PRESENT OR DAMAGE TO NAIL, INITIAL ENCOUNTER: ICD-10-CM

## 2018-05-27 PROCEDURE — 73630 X-RAY EXAM OF FOOT: CPT | Mod: TC,RT

## 2018-05-27 PROCEDURE — 90471 IMMUNIZATION ADMIN: CPT

## 2018-05-27 PROCEDURE — 99283 EMERGENCY DEPT VISIT LOW MDM: CPT | Mod: Z6 | Performed by: FAMILY MEDICINE

## 2018-05-27 PROCEDURE — 99283 EMERGENCY DEPT VISIT LOW MDM: CPT | Mod: 25

## 2018-05-27 RX ORDER — CEPHALEXIN 500 MG/1
500 CAPSULE ORAL ONCE
Status: DISCONTINUED | OUTPATIENT
Start: 2018-05-27 | End: 2018-05-28 | Stop reason: HOSPADM

## 2018-05-27 RX ORDER — CEPHALEXIN 500 MG/1
500 CAPSULE ORAL 4 TIMES DAILY
Qty: 28 CAPSULE | Refills: 0 | Status: SHIPPED | OUTPATIENT
Start: 2018-05-27 | End: 2018-06-03

## 2018-05-27 ASSESSMENT — ENCOUNTER SYMPTOMS
FATIGUE: 0
EXTREMITY NUMBNESS: 0
BACK PAIN: 0
MUSCLE WEAKNESS: 0
FEVER: 0
NECK PAIN: 0
STIFFNESS: 0

## 2018-05-27 NOTE — ED AVS SNAPSHOT
[facility]    [address]    [place] [address]    Phone:  [phone]                                       Octavio Pereira   MRN: [id number]    Department:  [facility]   Date of Visit:  5/27/2018           Patient Information     Date Of Birth          1980        Your diagnoses for this visit were:     Laceration of right great toe without foreign body present or damage to nail, initial encounter        You were seen by Janeen Krueger MD.      Follow-up Information     Follow up In 10 days.    Why:  For suture removal        Discharge Instructions          * LACERATION (All Closures)  A laceration is a cut through the skin. This will usually require stitches (sutures) or staples if it is deep. Minor cuts may be treated with a tape closure ( Steri-Strips ) or Dermabond skin glue.       HOME CARE:  PAIN MEDICINE: You may use acetaminophen (Tylenol) 650-1000 mg every 6 hours or ibuprofen (Motrin, Advil) 600 mg every 6-8 hours with food to control pain, if you are able to take these medicines. [NOTE: If you have chronic liver or kidney disease or ever had a stomach ulcer or GI bleeding, talk with your doctor before using these medicines.]  EXTREMITY, FACE or TRUNK WOUNDS:    Keep the wound clean and dry. If a bandage was applied and it becomes wet or dirty, replace it. Otherwise, leave it in place for the first 24 hours.    If stitches or staples were used, clean the wound daily. Protect the wound from sunlight and tanning lamps.    After removing the bandage, wash the area with soap and water. Use a wet cotton swab (Q tip) to loosen and remove any blood or crust that forms.    After cleaning, apply a thin layer of Polysporin or Bacitracin ointment. This will keep the wound clean and make it easier to remove the stitches or staples. Reapply a fresh bandage.    You may remove the bandage to shower as usual after the first 24 hours, but do not soak the area in  water (no swimming) until the stitches or staples are removed.    If Steri-Strips were used, keep the area clean and dry. If it becomes wet, blot it dry with a towel. It is okay to take a brief shower, but avoid scrubbing the area.    If Dermabond skin adhesive was used, do not scratch, rub or pick at the adhesive film. Do not place tape directly over the film. Do not apply liquid, ointment or creams to the wound while the film is in place. Do not clean the wound with peroxide and do not apply ointments. Avoid activities that cause heavy sweating until the film has fallen off. Protect the wound from prolonged exposure to sunlight or tanning lamps. You may shower as usual but do not soak the wound in water (no baths or swimming). The film will fall off by itself in 5-10 days.  SCALP WOUNDS: During the first two days, you may carefully rinse your hair in the shower to remove blood, glass or dirt particles. After two days, you may shower and shampoo your hair normally. Do not soak your scalp in the tub or go swimming until the stitches or staples have been removed.  MOUTH WOUNDS: Eat soft foods to reduce pain. If the cut is inside of your mouth, clean by rinsing after each meal and at bedtime with a mixture of equal parts water and Hydrogen Peroxide (do not swallow!). Or, you can use a cotton swab to directly apply Hydrogen Peroxide onto the cut.  After the wound is done healing, use sunscreen over the area whenever exposed for the next 6 minths to avoid a darker scar.     FOLLOW UP: Most skin wounds heal within ten days. Mouth and facial wounds heal within five days. However, even with proper treatment, a wound infection may sometimes occur. Therefore, you should check the wound daily for signs of infection listed below.  Stitches should be removed from the face within five days; stitches and staples should be removed from other parts of the body within 7-10 days. Unless you are told to come back to the emergency  room, you may have your doctor or urgent care remove the stitches. If dissolving stitches were used in the mouth, these will fall out or dissolve without the need for removal. If tape closures ( Steri-Strips ) were used, remove them yourself if they have not fallen off after 7 days. If Dermabond skin glue was used, the film will fall off by itself in 5-10 days.      GET PROMPT MEDICAL ATTENTION  if any of the following occur:    Increasing pain in the wound    Redness, swelling or pus coming from the wound    Fever over 101 F (38.3 C) oral    If stitches or staples come apart or fall out or if Steri-Strips fall off before seven days    If the wound edges re-open    Bleeding not controlled by direct pressure    8624-3925 [employer]. [address] [place] [address], [place] [address]. All rights reserved. This information is not intended as a substitute for professional medical care. Always follow your healthcare professional's instructions.  This information has been modified by your health care provider with permission from the publisher.      Extremity Laceration: Stitches, Staples, or Tape  A laceration is a cut through the skin. If it is deep, it may require stitches or staples to close so it can heal. Minor cuts may be treated with surgical tape closures, or skin glue.  X-rays may be done if something may have entered the skin through the cut. You may also need a tetanus shot if you are not up to date on this vaccine.  Home care    Follow the healthcare provider s instructions on how to care for the cut.    Wash your hands with soap and warm water before and after caring for your wound. This is to help prevent infection.    Keep the wound clean and dry. If a bandage was applied and it becomes wet or dirty, replace it. Otherwise, leave it in place for the first 24 hours, then change it once a day or as directed.    If stitches or staples were used, clean the wound daily:  ? After removing the bandage, wash  the area with soap and water. Use a wet cotton swab to loosen and remove any blood or crust that forms.  ? After cleaning, keep the wound clean and dry. Talk with your healthcare provider before putting any antibiotic ointment on the wound. Reapply the bandage.    You may remove the bandage to shower as usual after the first 24 hours, but don't soak the area in water (no swimming) until the stitches or staples are removed.    If surgical tape closures were used, keep the area clean and dry. If it becomes wet, blot it dry with a towel. Let the surgical tape fall off on its own.    The healthcare provider may prescribe an antibiotic cream or ointment to prevent infection. He or she may also prescribe an antibiotic pill. Don't stop taking this medicine until you have finished it all or the provider tells you to stop.    The provider may also prescribe medicine for pain. Follow the instructions for taking these medicines.    Don't do activities that may reopen your wound.  Follow-up care  Follow up with your healthcare provider, or as advised. Most skin wounds heal within 10 days. But an infection may sometimes occur even with proper treatment. Check the wound daily for the signs of infection listed below. Stitches and staples should be removed within 7 to14 days. If surgical tape closures were used, you may remove them after 10 days if they have not fallen off by then.   When to seek medical advice  Call your healthcare provider right away if any of these occur:    Wound bleeding not controlled by direct pressure    Signs of infection, including increasing pain in the wound, increasing wound redness or swelling, or pus or bad odor coming from the wound    Fever of 100.4 F (38 C) or higher, or as directed by your healthcare provider    Stitches or staples come apart or fall out or surgical tape falls off before 7 days    Wound edges reopen    Wound changes colors    Numbness occurs around the wound     Decreased  movement around the injured area  Date Last Reviewed: 7/1/2017 2000-2017 [employer]. [address] [place] [address], [place] [address]. All rights reserved. This information is not intended as a substitute for professional medical care. Always follow your healthcare professional's instructions.             Review of your medicines      START taking        Dose / Directions Last dose taken    cephALEXin 500 MG capsule   Commonly known as:  KEFLEX   Dose:  500 mg   Quantity:  28 capsule        Take 1 capsule (500 mg) by mouth 4 times daily for 7 days   Refills:  0          Our records show that you are taking the medicines listed below. If these are incorrect, please call your family doctor or clinic.        Dose / Directions Last dose taken    buPROPion 300 MG 24 hr tablet   Commonly known as:  WELLBUTRIN XL   Dose:  300 mg   Quantity:  90 tablet        Take 1 tablet (300 mg) by mouth every morning   Refills:  3        citalopram 20 MG tablet   Commonly known as:  celeXA   Dose:  20 mg   Quantity:  90 tablet        Take 1 tablet (20 mg) by mouth daily   Refills:  3        lisinopril 20 MG tablet   Commonly known as:  PRINIVIL/ZESTRIL   Dose:  20 mg   Quantity:  90 tablet        Take 1 tablet (20 mg) by mouth daily   Refills:  3        rOPINIRole 0.5 MG tablet   Commonly known as:  REQUIP   Dose:  0.5 mg   Quantity:  30 tablet        Take 1 tablet (0.5 mg) by mouth nightly as needed   Refills:  1                Prescriptions were sent or printed at these locations (1 Prescription)                   [facility] [address] [place] [address]    Telephone:  [phone]   Fax:  [phone]   Hours:                  Printed at Department/Unit printer (1 of 1)         cephALEXin (KEFLEX) 500 MG capsule                Procedures and tests performed during your visit     Foot  XR, G/E 3 views, right      Orders  "Needing Specimen Collection     None      Pending Results     Date and Time Order Name Status Description    2018 2221 Foot  XR, G/E 3 views, right In process             Pending Culture Results     No orders found from 2018 to 2018.            Thank you for choosing [facility]       Thank you for choosing [facility] for your care. Our goal is always to provide you with excellent care. Hearing back from our patients is one way we can continue to improve our services. Please take a few minutes to complete the written survey that you may receive in the mail after you visit with us. Thank you!        [employer] Information     [employer] lets you send messages to your doctor, view your test results, renew your prescriptions, schedule appointments and more. To sign up, go to [email or web address] . Click on \"Log in\" on the left side of the screen, which will take you to the Welcome page. Then click on \"Sign up Now\" on the right side of the page.     You will be asked to enter the access code listed below, as well as some personal information. Please follow the directions to create your username and password.     Your access code is: [email or web address]  Expires: 2018 11:51 PM     Your access code will  in 90 days. If you need help or a new code, please call your [facility] clinic or [phone].        Care EveryWhere ID     This is your Care EveryWhere ID. This could be used by other organizations to access your [facility] medical records  [id number]        Equal Access to Services     CARMENCITA RICHTER : Hadii mayela gileso Sobryce, waaxda luqadaha, qaybta kaalmada ashley morgan . So [facility] [phone].    ATENCIÓN: Si habla español, tiene a domínguez disposición servicios gratuitos de asistencia lingüística. Llame al [phone].    We comply with applicable federal civil rights laws and [place] laws. We do not discriminate on the basis of race, color, national origin, age, " disability, sex, sexual orientation, or gender identity.            After Visit Summary       This is your record. Keep this with you and show to your community pharmacist(s) and doctor(s) at your next visit.

## 2018-05-27 NOTE — ED AVS SNAPSHOT
[facility]    [address] [address]    Phone:  [phone]                                       Octavio Pereira   MRN: [id number]    Department[facility]  [facility]   Date of Visit:  5/27/2018           After Visit Summary Signature Page     I have received my discharge instructions, and my questions have been answered. I have discussed any challenges I see with this plan with the nurse or doctor.    ..........................................................................................................................................  Patient/Patient Representative Signature      ..........................................................................................................................................  Patient Representative Print Name and Relationship to Patient    ..................................................               ................................................  Date                                            Time    ..........................................................................................................................................  Reviewed by Signature/Title    ...................................................              ..............................................  Date                                                            Time

## 2018-05-28 VITALS
OXYGEN SATURATION: 96 % | TEMPERATURE: 97.6 F | DIASTOLIC BLOOD PRESSURE: 82 MMHG | HEART RATE: 85 BPM | RESPIRATION RATE: 14 BRPM | SYSTOLIC BLOOD PRESSURE: 130 MMHG

## 2018-05-28 PROCEDURE — 90715 TDAP VACCINE 7 YRS/> IM: CPT | Performed by: FAMILY MEDICINE

## 2018-05-28 PROCEDURE — 25000132 ZZH RX MED GY IP 250 OP 250 PS 637: Performed by: FAMILY MEDICINE

## 2018-05-28 PROCEDURE — 25000128 H RX IP 250 OP 636: Performed by: FAMILY MEDICINE

## 2018-05-28 RX ORDER — CEPHALEXIN 500 MG/1
500 CAPSULE ORAL ONCE
Status: COMPLETED | OUTPATIENT
Start: 2018-05-28 | End: 2018-05-28

## 2018-05-28 RX ADMIN — CEPHALEXIN 500 MG: 500 CAPSULE ORAL at 00:11

## 2018-05-28 RX ADMIN — CLOSTRIDIUM TETANI TOXOID ANTIGEN (FORMALDEHYDE INACTIVATED), CORYNEBACTERIUM DIPHTHERIAE TOXOID ANTIGEN (FORMALDEHYDE INACTIVATED), BORDETELLA PERTUSSIS TOXOID ANTIGEN (GLUTARALDEHYDE INACTIVATED), BORDETELLA PERTUSSIS FILAMENTOUS HEMAGGLUTININ ANTIGEN (FORMALDEHYDE INACTIVATED), BORDETELLA PERTUSSIS PERTACTIN ANTIGEN, AND BORDETELLA PERTUSSIS FIMBRIAE 2/3 ANTIGEN 0.5 ML: 5; 2; 2.5; 5; 3; 5 INJECTION, SUSPENSION INTRAMUSCULAR at 00:09

## 2018-05-28 NOTE — DISCHARGE INSTRUCTIONS
* LACERATION (All Closures)  A laceration is a cut through the skin. This will usually require stitches (sutures) or staples if it is deep. Minor cuts may be treated with a tape closure ( Steri-Strips ) or Dermabond skin glue.       HOME CARE:  PAIN MEDICINE: You may use acetaminophen (Tylenol) 650-1000 mg every 6 hours or ibuprofen (Motrin, Advil) 600 mg every 6-8 hours with food to control pain, if you are able to take these medicines. [NOTE: If you have chronic liver or kidney disease or ever had a stomach ulcer or GI bleeding, talk with your doctor before using these medicines.]  EXTREMITY, FACE or TRUNK WOUNDS:    Keep the wound clean and dry. If a bandage was applied and it becomes wet or dirty, replace it. Otherwise, leave it in place for the first 24 hours.    If stitches or staples were used, clean the wound daily. Protect the wound from sunlight and tanning lamps.    After removing the bandage, wash the area with soap and water. Use a wet cotton swab (Q tip) to loosen and remove any blood or crust that forms.    After cleaning, apply a thin layer of Polysporin or Bacitracin ointment. This will keep the wound clean and make it easier to remove the stitches or staples. Reapply a fresh bandage.    You may remove the bandage to shower as usual after the first 24 hours, but do not soak the area in water (no swimming) until the stitches or staples are removed.    If Steri-Strips were used, keep the area clean and dry. If it becomes wet, blot it dry with a towel. It is okay to take a brief shower, but avoid scrubbing the area.    If Dermabond skin adhesive was used, do not scratch, rub or pick at the adhesive film. Do not place tape directly over the film. Do not apply liquid, ointment or creams to the wound while the film is in place. Do not clean the wound with peroxide and do not apply ointments. Avoid activities that cause heavy sweating until the film has fallen off. Protect the wound from prolonged  exposure to sunlight or tanning lamps. You may shower as usual but do not soak the wound in water (no baths or swimming). The film will fall off by itself in 5-10 days.  SCALP WOUNDS: During the first two days, you may carefully rinse your hair in the shower to remove blood, glass or dirt particles. After two days, you may shower and shampoo your hair normally. Do not soak your scalp in the tub or go swimming until the stitches or staples have been removed.  MOUTH WOUNDS: Eat soft foods to reduce pain. If the cut is inside of your mouth, clean by rinsing after each meal and at bedtime with a mixture of equal parts water and Hydrogen Peroxide (do not swallow!). Or, you can use a cotton swab to directly apply Hydrogen Peroxide onto the cut.  After the wound is done healing, use sunscreen over the area whenever exposed for the next 6 minths to avoid a darker scar.     FOLLOW UP: Most skin wounds heal within ten days. Mouth and facial wounds heal within five days. However, even with proper treatment, a wound infection may sometimes occur. Therefore, you should check the wound daily for signs of infection listed below.  Stitches should be removed from the face within five days; stitches and staples should be removed from other parts of the body within 7-10 days. Unless you are told to come back to the emergency room, you may have your doctor or urgent care remove the stitches. If dissolving stitches were used in the mouth, these will fall out or dissolve without the need for removal. If tape closures ( Steri-Strips ) were used, remove them yourself if they have not fallen off after 7 days. If Dermabond skin glue was used, the film will fall off by itself in 5-10 days.      GET PROMPT MEDICAL ATTENTION  if any of the following occur:    Increasing pain in the wound    Redness, swelling or pus coming from the wound    Fever over 101 F (38.3 C) oral    If stitches or staples come apart or fall out or if Steri-Strips fall  off before seven days    If the wound edges re-open    Bleeding not controlled by direct pressure    5420-2430 [employer]. [address] [place] [address], [place] [address]. All rights reserved. This information is not intended as a substitute for professional medical care. Always follow your healthcare professional's instructions.  This information has been modified by your health care provider with permission from the publisher.      Extremity Laceration: Stitches, Staples, or Tape  A laceration is a cut through the skin. If it is deep, it may require stitches or staples to close so it can heal. Minor cuts may be treated with surgical tape closures, or skin glue.  X-rays may be done if something may have entered the skin through the cut. You may also need a tetanus shot if you are not up to date on this vaccine.  Home care    Follow the healthcare provider s instructions on how to care for the cut.    Wash your hands with soap and warm water before and after caring for your wound. This is to help prevent infection.    Keep the wound clean and dry. If a bandage was applied and it becomes wet or dirty, replace it. Otherwise, leave it in place for the first 24 hours, then change it once a day or as directed.    If stitches or staples were used, clean the wound daily:  ? After removing the bandage, wash the area with soap and water. Use a wet cotton swab to loosen and remove any blood or crust that forms.  ? After cleaning, keep the wound clean and dry. Talk with your healthcare provider before putting any antibiotic ointment on the wound. Reapply the bandage.    You may remove the bandage to shower as usual after the first 24 hours, but don't soak the area in water (no swimming) until the stitches or staples are removed.    If surgical tape closures were used, keep the area clean and dry. If it becomes wet, blot it dry with a towel. Let the surgical tape fall off on its own.    The healthcare provider may  prescribe an antibiotic cream or ointment to prevent infection. He or she may also prescribe an antibiotic pill. Don't stop taking this medicine until you have finished it all or the provider tells you to stop.    The provider may also prescribe medicine for pain. Follow the instructions for taking these medicines.    Don't do activities that may reopen your wound.  Follow-up care  Follow up with your healthcare provider, or as advised. Most skin wounds heal within 10 days. But an infection may sometimes occur even with proper treatment. Check the wound daily for the signs of infection listed below. Stitches and staples should be removed within 7 to14 days. If surgical tape closures were used, you may remove them after 10 days if they have not fallen off by then.   When to seek medical advice  Call your healthcare provider right away if any of these occur:    Wound bleeding not controlled by direct pressure    Signs of infection, including increasing pain in the wound, increasing wound redness or swelling, or pus or bad odor coming from the wound    Fever of 100.4 F (38 C) or higher, or as directed by your healthcare provider    Stitches or staples come apart or fall out or surgical tape falls off before 7 days    Wound edges reopen    Wound changes colors    Numbness occurs around the wound     Decreased movement around the injured area  Date Last Reviewed: 7/1/2017 2000-2017 [employer]. [address] [place] [address], [place] [address]. All rights reserved. This information is not intended as a substitute for professional medical care. Always follow your healthcare professional's instructions.

## 2018-05-28 NOTE — ED NOTES
Patient discharged to home per self. Patient verbalize/demonstrate understanding of all written and verbal instructions. Prescription given as take home pack and printed. All questions answered.

## 2018-05-28 NOTE — ED NOTES
Face to face report given with opportunity to observe patient.    Report given to lay Pope   5/27/2018  11:11 PM

## 2018-05-28 NOTE — ED PROVIDER NOTES
History     Chief Complaint   Patient presents with     Laceration     dropped stereo  on Rt foot     Patient is a 38 year old male presenting with foot injury.   Foot Injury   Location:  Toe  Time since incident:  1 hour  Injury: yes    Mechanism of injury: crush    Mechanism of injury comment:  Dropped 50 pound stereo    Crush:     Mechanism:  Falling object    Approximate weight of object:  50 pounds  Toe location:  R great toe  Pain details:     Quality:  Dull    Radiates to:  Does not radiate    Severity:  No pain    Onset quality:  Sudden    Duration:  1 hour    Timing:  Constant    Progression:  Unchanged  Chronicity:  New  Dislocation: no    Foreign body present:  No foreign bodies  Tetanus status:  Unknown  Prior injury to area:  No  Relieved by:  Nothing  Worsened by:  Nothing  Ineffective treatments: tourniquet.  Associated symptoms: decreased ROM and swelling    Associated symptoms: no back pain, no fatigue, no fever, no itching, no muscle weakness, no neck pain, no numbness, no stiffness and no tingling    Risk factors: no concern for non-accidental trauma, no frequent fractures, no known bone disorder, no obesity and no recent illness      Octavio Pereira is a 38 year old male who presents with laceration to the right great toe     Problem List:    Patient Active Problem List    Diagnosis Date Noted     S/P cervical discectomy 03/15/2016     Priority: Medium     JOSÉ MIGUEL (obstructive sleep apnea) 10/31/2014     Priority: Medium     Blunt injury, left eye 07/10/2012     Priority: Medium     CARDIOVASCULAR SCREENING; LDL GOAL LESS THAN 160 10/31/2010     Priority: Medium     Hypertension 06/06/2007     Priority: Medium     Mild Depression 06/06/2007     Priority: Medium     GYNECOMASTIA 06/13/2003     Priority: Medium        Past Medical History:    Past Medical History:   Diagnosis Date     BENIGN HYPERTENSION 6/6/2007     DEPRESSIVE DISORDER NEC 6/6/2007     Sleep apnea        Past Surgical  History:    Past Surgical History:   Procedure Laterality Date     FORAMINOTOMY CERVICAL POSTERIOR MINIMALLY INVASIVE ONE LEVEL Left 3/15/2016    Procedure: FORAMINOTOMY CERVICAL POSTERIOR MINIMALLY INVASIVE ONE LEVEL;  Surgeon: Reza Garvin MD;  Location: [facility]     ORTHOPEDIC SURGERY      thumb tendon repair     SURGICAL HISTORY OF -       Thumb tendon repair       Family History:    Family History   Problem Relation Age of Onset     C.A.D. Father      C.A.D. Maternal Grandmother      DIABETES Father      Depression Father        Social History:  Marital Status:   [2]  Social History   Substance Use Topics     Smoking status: Never Smoker     Smokeless tobacco: Never Used     Alcohol use 0.0 oz/week     0 Standard drinks or equivalent per week      Comment: socially        Medications:      buPROPion (WELLBUTRIN XL) 300 MG 24 hr tablet   citalopram (CELEXA) 20 MG tablet   lisinopril (PRINIVIL/ZESTRIL) 20 MG tablet   rOPINIRole (REQUIP) 0.5 MG tablet         Review of Systems   Constitutional: Negative for fatigue and fever.   HENT: Negative.    Eyes: Negative.    Respiratory: Negative.    Cardiovascular: Negative.    Gastrointestinal: Negative.    Genitourinary: Negative.    Musculoskeletal: Negative for back pain, neck pain and stiffness.        Laceration of great toe    Skin: Negative for itching.   Neurological: Negative.    All other systems reviewed and are negative.      Physical Exam   BP: 141/89  Pulse: 82  Temp: 97.6  F (36.4  C)  Resp: 16  SpO2: 97 %      Physical Exam   Constitutional: He is oriented to person, place, and time. He appears well-developed and well-nourished.   HENT:   Head: Normocephalic and atraumatic.   Eyes: Pupils are equal, round, and reactive to light.   Neck: Normal range of motion. Neck supple.   Cardiovascular: Normal rate and regular rhythm.  Exam reveals no gallop and no friction rub.    No murmur heard.  Pulmonary/Chest: Effort normal and breath sounds normal. No  respiratory distress. He has no wheezes. He has no rales. He exhibits no tenderness.   Abdominal: Soft.   Neurological: He is alert and oriented to person, place, and time. No cranial nerve deficit.   Skin: Skin is warm.   Psychiatric: He has a normal mood and affect.   Nursing note and vitals reviewed.      ED Course     ED Course     Procedures    Patient arrived to ER with complaint of great toe laceration . Patient triaged to exam room. Vital signs obtained . CMS intact. Xrays obtained. No fracture . Following review of xray laceration repaired in usual fashion . No complications EBL drops. Laceration dressed and patent placed in protective post op shoe. Follow up in 7-10  Days for suture removal . Tetanus updated prior to discharge          No results found for this or any previous visit (from the past 24 hour(s)).    Medications - No data to display    Assessments & Plan (with Medical Decision Making)     I have reviewed the nursing notes.    I have reviewed the findings, diagnosis, plan and need for follow up with the patient.      New Prescriptions    No medications on file       Final diagnoses:   Laceration of right great toe without foreign body present or damage to nail, initial encounter       5/27/2018   [facility]     Janeen Krueger MD  05/29/18 0039

## 2018-05-28 NOTE — ED NOTES
Pt arrives to ER with report of dropping stereo  on foot approximately 30 minutes prior. Pt reports being able to see bone.  Pt has foot/toe wrapped with tape and gauze.  Pt able to ambulate to room 11.  Pt reports pain 3/10.

## 2018-05-29 ASSESSMENT — ENCOUNTER SYMPTOMS
EYES NEGATIVE: 1
NEUROLOGICAL NEGATIVE: 1
RESPIRATORY NEGATIVE: 1
CARDIOVASCULAR NEGATIVE: 1
GASTROINTESTINAL NEGATIVE: 1

## 2018-06-21 ENCOUNTER — TRANSFERRED RECORDS (OUTPATIENT)
Dept: HEALTH INFORMATION MANAGEMENT | Facility: CLINIC | Age: 38
End: 2018-06-21

## 2018-10-05 ENCOUNTER — TRANSFERRED RECORDS (OUTPATIENT)
Dept: HEALTH INFORMATION MANAGEMENT | Facility: CLINIC | Age: 38
End: 2018-10-05

## 2018-10-17 ENCOUNTER — TRANSFERRED RECORDS (OUTPATIENT)
Dept: HEALTH INFORMATION MANAGEMENT | Facility: CLINIC | Age: 38
End: 2018-10-17

## 2018-10-18 ENCOUNTER — TRANSFERRED RECORDS (OUTPATIENT)
Dept: HEALTH INFORMATION MANAGEMENT | Facility: CLINIC | Age: 38
End: 2018-10-18

## 2018-11-13 DIAGNOSIS — I10 ESSENTIAL HYPERTENSION, BENIGN: ICD-10-CM

## 2018-11-13 RX ORDER — LISINOPRIL 20 MG/1
TABLET ORAL
Qty: 90 TABLET | Refills: 2 | OUTPATIENT
Start: 2018-11-13

## 2018-11-13 NOTE — TELEPHONE ENCOUNTER
"Requested Prescriptions   Pending Prescriptions Disp Refills     lisinopril (PRINIVIL/ZESTRIL) 20 MG tablet [Pharmacy Med Name: Lisinopril Oral Tablet 20 MG]    Last Written Prescription Date:  12/13/2017  Last Fill Quantity: 90,  # refills: 3   Last office visit: 12/13/2017 with prescribing provider:  Francis Morales     Future Office Visit:     90 tablet 2     Sig: TAKE ONE TABLET BY MOUTH DAILY    ACE Inhibitors (Including Combos) Protocol Passed    11/13/2018  7:01 AM       Passed - Blood pressure under 140/90 in past 12 months    BP Readings from Last 3 Encounters:   05/28/18 130/82   12/13/17 122/80   10/15/17 110/60                Passed - Recent (12 mo) or future (30 days) visit within the authorizing provider's specialty    Patient had office visit in the last 12 months or has a visit in the next 30 days with authorizing provider or within the authorizing provider's specialty.  See \"Patient Info\" tab in inbasket, or \"Choose Columns\" in Meds & Orders section of the refill encounter.             Passed - Patient is age 18 or older       Passed - Normal serum creatinine on file in past 12 months    Recent Labs   Lab Test  12/13/17   1003   CR  1.04            Passed - Normal serum potassium on file in past 12 months    Recent Labs   Lab Test  12/13/17   1003   POTASSIUM  4.0               "

## 2018-11-13 NOTE — TELEPHONE ENCOUNTER
Patient has refills remaining with pharmacy.  Sylvia Lara RN - Triage  [facility]

## 2018-12-10 ENCOUNTER — TELEPHONE (OUTPATIENT)
Dept: PEDIATRICS | Facility: CLINIC | Age: 38
End: 2018-12-10

## 2018-12-10 DIAGNOSIS — I10 ESSENTIAL HYPERTENSION, BENIGN: ICD-10-CM

## 2018-12-11 RX ORDER — LISINOPRIL 20 MG/1
TABLET ORAL
Qty: 90 TABLET | Refills: 0 | Status: SHIPPED | OUTPATIENT
Start: 2018-12-11 | End: 2019-03-28

## 2018-12-11 NOTE — TELEPHONE ENCOUNTER
Medication is being filled for 1 time refill only due to:  Patient is due for labs and office visit.   Please call patient to schedule office and lab work.     BONNIE Ellis RN

## 2018-12-11 NOTE — TELEPHONE ENCOUNTER
"Requested Prescriptions   Pending Prescriptions Disp Refills     lisinopril (PRINIVIL/ZESTRIL) 20 MG tablet [Pharmacy Med Name: Lisinopril Oral Tablet 20 MG]  Last Written Prescription Date:  12/13/2017  Last Fill Quantity: 90 tablet,  # refills: 3   Last office visit: 12/13/2017 with prescribing provider:  Francis Morales MD    Future Office Visit:     90 tablet 2     Sig: TAKE ONE TABLET BY MOUTH DAILY    ACE Inhibitors (Including Combos) Protocol Passed - 12/10/2018  7:51 PM       Passed - Blood pressure under 140/90 in past 12 months    BP Readings from Last 3 Encounters:   05/28/18 130/82   12/13/17 122/80   10/15/17 110/60                Passed - Recent (12 mo) or future (30 days) visit within the authorizing provider's specialty    Patient had office visit in the last 12 months or has a visit in the next 30 days with authorizing provider or within the authorizing provider's specialty.  See \"Patient Info\" tab in inbasket, or \"Choose Columns\" in Meds & Orders section of the refill encounter.             Passed - Patient is age 18 or older       Passed - Normal serum creatinine on file in past 12 months    Recent Labs   Lab Test 12/13/17  1003   CR 1.04            Passed - Normal serum potassium on file in past 12 months    Recent Labs   Lab Test 12/13/17  1003   POTASSIUM 4.0               "

## 2019-02-17 DIAGNOSIS — F32.0 MAJOR DEPRESSIVE DISORDER, SINGLE EPISODE, MILD (H): ICD-10-CM

## 2019-02-17 NOTE — TELEPHONE ENCOUNTER
"Requested Prescriptions   Pending Prescriptions Disp Refills     buPROPion (WELLBUTRIN XL) 300 MG 24 hr tablet [Pharmacy Med Name: buPROPion HCl ER (XL) Oral Tablet Extended Release 24 Hour 300  Last Written Prescription Date:  12/13/2017  Last Fill Quantity: 90 tablet,  # refills: 3   Last office visit: 12/13/2017 with prescribing provider:  Francis Morales MD    Future Office Visit:     MG] 90 tablet 2     Sig: TAKE ONE TABLET BY MOUTH IN THE MORNING    SSRIs Protocol Failed - 2/17/2019  7:01 AM       Failed - PHQ-9 score less than 5 in past 6 months    Please review last PHQ-9 score.   PHQ-9 SCORE 7/19/2016 3/10/2017 12/13/2017   PHQ-9 Total Score - - -   PHQ-9 Total Score 2 1 1     FELICITA-7 SCORE 7/19/2016   Total Score 7                Failed - Recent (6 mo) or future (30 days) visit within the authorizing provider's specialty    Patient had office visit in the last 6 months or has a visit in the next 30 days with authorizing provider or within the authorizing provider's specialty.  See \"Patient Info\" tab in inBarosenseet, or \"Choose Columns\" in Meds & Orders section of the refill encounter.           Passed - Medication is Bupropion    If the medication is Bupropion (Wellbutrin), and the patient is taking for smoking cessation; OK to refill.         Passed - Medication is active on med list       Passed - Patient is age 18 or older        citalopram (CELEXA) 20 MG tablet [Pharmacy Med Name: Citalopram Hydrobromide Oral Tablet 20 MG]  Last Written Prescription Date:  12/13/2017  Last Fill Quantity: 90 tablet,  # refills: 3   Last office visit: 12/13/2017 with prescribing provider:  Francis Morales MD    Future Office Visit:     90 tablet 2     Sig: TAKE ONE TABLET BY MOUTH DAILY    SSRIs Protocol Failed - 2/17/2019  7:01 AM       Failed - PHQ-9 score less than 5 in past 6 months    Please review last PHQ-9 score.   PHQ-9 SCORE 7/19/2016 3/10/2017 12/13/2017   PHQ-9 Total Score - - -   PHQ-9 Total Score 2 1 1     FELICITA-7 " "SCORE 7/19/2016   Total Score 7                Failed - Recent (6 mo) or future (30 days) visit within the authorizing provider's specialty    Patient had office visit in the last 6 months or has a visit in the next 30 days with authorizing provider or within the authorizing provider's specialty.  See \"Patient Info\" tab in inbasket, or \"Choose Columns\" in Meds & Orders section of the refill encounter.           Passed - Medication is Bupropion    If the medication is Bupropion (Wellbutrin), and the patient is taking for smoking cessation; OK to refill.         Passed - Medication is active on med list       Passed - Patient is age 18 or older          "

## 2019-02-20 RX ORDER — BUPROPION HYDROCHLORIDE 300 MG/1
300 TABLET ORAL EVERY MORNING
Qty: 30 TABLET | Refills: 0 | Status: SHIPPED | OUTPATIENT
Start: 2019-02-20 | End: 2019-04-24

## 2019-02-20 RX ORDER — CITALOPRAM HYDROBROMIDE 20 MG/1
20 TABLET ORAL DAILY
Qty: 30 TABLET | Refills: 0 | Status: SHIPPED | OUTPATIENT
Start: 2019-02-20 | End: 2019-04-24

## 2019-02-20 NOTE — TELEPHONE ENCOUNTER
1st attempt, LM for patient to call back.  Needs fasting px or fasting medication follow-up for all meds.  Last seen in 2017.  Oniel Landin CMA ([facility])

## 2019-02-20 NOTE — TELEPHONE ENCOUNTER
Routing refill request to provider for review/approval because:  Patient needs to be seen because it has been more than 1 year since last office visit.    Will forward to the station, please try and call and help the pt set up an appt for a physical.  Thanks!

## 2019-03-27 DIAGNOSIS — I10 ESSENTIAL HYPERTENSION, BENIGN: ICD-10-CM

## 2019-03-27 NOTE — TELEPHONE ENCOUNTER
"Requested Prescriptions   Pending Prescriptions Disp Refills     lisinopril (PRINIVIL/ZESTRIL) 20 MG tablet  Last Written Prescription Date:  12/11/2018  Last Fill Quantity: 90 tablet,  # refills: 0   Last office visit: 12/13/2017 with prescribing provider:  Francis Morales MD    Future Office Visit:     90 tablet 0     Sig: Take 1 tablet (20 mg) by mouth daily    ACE Inhibitors (Including Combos) Protocol Failed - 3/27/2019  3:23 PM       Failed - Recent (12 mo) or future (30 days) visit within the authorizing provider's specialty    Patient had office visit in the last 12 months or has a visit in the next 30 days with authorizing provider or within the authorizing provider's specialty.  See \"Patient Info\" tab in inbasket, or \"Choose Columns\" in Meds & Orders section of the refill encounter.             Failed - Normal serum creatinine on file in past 12 months    Recent Labs   Lab Test 12/13/17  1003   CR 1.04            Failed - Normal serum potassium on file in past 12 months    Recent Labs   Lab Test 12/13/17  1003   POTASSIUM 4.0            Passed - Blood pressure under 140/90 in past 12 months    BP Readings from Last 3 Encounters:   05/28/18 130/82   12/13/17 122/80   10/15/17 110/60                Passed - Medication is active on med list       Passed - Patient is age 18 or older          "

## 2019-03-28 RX ORDER — LISINOPRIL 20 MG/1
20 TABLET ORAL DAILY
Qty: 30 TABLET | Refills: 0 | Status: SHIPPED | OUTPATIENT
Start: 2019-03-28 | End: 2019-04-24

## 2019-03-28 NOTE — TELEPHONE ENCOUNTER
30 day supply given.  Patient is due for yearly physical and lab work.  Please call and assist with scheduling appointment prior to next refill   Sylvia GARZA RN - Triage  [facility]

## 2019-03-29 NOTE — TELEPHONE ENCOUNTER
The pt is aware of his need for an appointment and he will call back to reschedule.   Lyn Martinez on 3/29/2019 at 10:05 AM

## 2019-04-08 NOTE — TELEPHONE ENCOUNTER
Type of outreach:  Phone, left message for patient to call back.   Health Maintenance Due   Topic Date Due     SPIROMETRY ONETIME  1980     COPD ACTION PLAN Q1 YR  1980     ADVANCE DIRECTIVE PLANNING Q5 YRS  05/05/1998     HIV SCREEN (SYSTEM ASSIGNED)  05/05/1998     PREVENTIVE CARE VISIT  07/23/2014     PHQ-9 Q6 MONTHS  06/13/2018     LIPID SCREEN Q5 YR MALE (SYSTEM ASSIGNED)  07/19/2018     INFLUENZA VACCINE (1) 09/01/2018     2nd attempt, needs fasting px, depression follow-up.  Oniel Landin CMA ([facility])

## 2019-04-24 ENCOUNTER — OFFICE VISIT (OUTPATIENT)
Dept: PEDIATRICS | Facility: CLINIC | Age: 39
End: 2019-04-24
Payer: COMMERCIAL

## 2019-04-24 VITALS
OXYGEN SATURATION: 98 % | BODY MASS INDEX: 26.09 KG/M2 | TEMPERATURE: 96.2 F | WEIGHT: 221 LBS | DIASTOLIC BLOOD PRESSURE: 78 MMHG | HEIGHT: 77 IN | SYSTOLIC BLOOD PRESSURE: 118 MMHG | HEART RATE: 80 BPM

## 2019-04-24 DIAGNOSIS — K21.9 GASTROESOPHAGEAL REFLUX DISEASE WITHOUT ESOPHAGITIS: ICD-10-CM

## 2019-04-24 DIAGNOSIS — G47.33 OSA (OBSTRUCTIVE SLEEP APNEA): ICD-10-CM

## 2019-04-24 DIAGNOSIS — F32.0 MAJOR DEPRESSIVE DISORDER, SINGLE EPISODE, MILD (H): ICD-10-CM

## 2019-04-24 DIAGNOSIS — I10 ESSENTIAL HYPERTENSION, BENIGN: ICD-10-CM

## 2019-04-24 DIAGNOSIS — Z00.00 ROUTINE GENERAL MEDICAL EXAMINATION AT A HEALTH CARE FACILITY: Primary | ICD-10-CM

## 2019-04-24 LAB
ANION GAP SERPL CALCULATED.3IONS-SCNC: 4 MMOL/L (ref 3–14)
BUN SERPL-MCNC: 19 MG/DL (ref 7–30)
CALCIUM SERPL-MCNC: 9.6 MG/DL (ref 8.5–10.1)
CHLORIDE SERPL-SCNC: 104 MMOL/L (ref 94–109)
CHOLEST SERPL-MCNC: 192 MG/DL
CO2 SERPL-SCNC: 29 MMOL/L (ref 20–32)
CREAT SERPL-MCNC: 1.26 MG/DL (ref 0.66–1.25)
GFR SERPL CREATININE-BSD FRML MDRD: 71 ML/MIN/{1.73_M2}
GLUCOSE SERPL-MCNC: 86 MG/DL (ref 70–99)
HDLC SERPL-MCNC: 52 MG/DL
LDLC SERPL CALC-MCNC: 127 MG/DL
NONHDLC SERPL-MCNC: 140 MG/DL
POTASSIUM SERPL-SCNC: 4.4 MMOL/L (ref 3.4–5.3)
SODIUM SERPL-SCNC: 137 MMOL/L (ref 133–144)
TRIGL SERPL-MCNC: 67 MG/DL

## 2019-04-24 PROCEDURE — 80061 LIPID PANEL: CPT | Performed by: INTERNAL MEDICINE

## 2019-04-24 PROCEDURE — 36415 COLL VENOUS BLD VENIPUNCTURE: CPT | Performed by: INTERNAL MEDICINE

## 2019-04-24 PROCEDURE — 99395 PREV VISIT EST AGE 18-39: CPT | Performed by: INTERNAL MEDICINE

## 2019-04-24 PROCEDURE — 80048 BASIC METABOLIC PNL TOTAL CA: CPT | Performed by: INTERNAL MEDICINE

## 2019-04-24 RX ORDER — LISINOPRIL 20 MG/1
20 TABLET ORAL DAILY
Qty: 90 TABLET | Refills: 3 | Status: SHIPPED | OUTPATIENT
Start: 2019-04-24 | End: 2020-05-04

## 2019-04-24 RX ORDER — CITALOPRAM HYDROBROMIDE 20 MG/1
20 TABLET ORAL DAILY
Qty: 90 TABLET | Refills: 3 | Status: SHIPPED | OUTPATIENT
Start: 2019-04-24 | End: 2020-05-04

## 2019-04-24 RX ORDER — BUPROPION HYDROCHLORIDE 300 MG/1
300 TABLET ORAL EVERY MORNING
Qty: 90 TABLET | Refills: 3 | Status: SHIPPED | OUTPATIENT
Start: 2019-04-24 | End: 2020-05-04

## 2019-04-24 RX ORDER — FAMOTIDINE 40 MG/1
40 TABLET, FILM COATED ORAL
Qty: 30 TABLET | Refills: 11 | Status: SHIPPED | OUTPATIENT
Start: 2019-04-24 | End: 2020-05-04

## 2019-04-24 ASSESSMENT — ENCOUNTER SYMPTOMS
EYE PAIN: 0
MYALGIAS: 0
HEARTBURN: 0
HEMATOCHEZIA: 0
HEADACHES: 0
FEVER: 0
PARESTHESIAS: 0
ARTHRALGIAS: 0
NERVOUS/ANXIOUS: 0
PALPITATIONS: 0
JOINT SWELLING: 0
HEMATURIA: 0
CHILLS: 0
FREQUENCY: 0
DIARRHEA: 0
CONSTIPATION: 0
SHORTNESS OF BREATH: 0
DYSURIA: 0
NAUSEA: 0
WEAKNESS: 0
ABDOMINAL PAIN: 0
DIZZINESS: 0
COUGH: 0
SORE THROAT: 0

## 2019-04-24 ASSESSMENT — ANXIETY QUESTIONNAIRES
6. BECOMING EASILY ANNOYED OR IRRITABLE: NOT AT ALL
3. WORRYING TOO MUCH ABOUT DIFFERENT THINGS: NOT AT ALL
IF YOU CHECKED OFF ANY PROBLEMS ON THIS QUESTIONNAIRE, HOW DIFFICULT HAVE THESE PROBLEMS MADE IT FOR YOU TO DO YOUR WORK, TAKE CARE OF THINGS AT HOME, OR GET ALONG WITH OTHER PEOPLE: NOT DIFFICULT AT ALL
GAD7 TOTAL SCORE: 0
2. NOT BEING ABLE TO STOP OR CONTROL WORRYING: NOT AT ALL
7. FEELING AFRAID AS IF SOMETHING AWFUL MIGHT HAPPEN: NOT AT ALL
5. BEING SO RESTLESS THAT IT IS HARD TO SIT STILL: NOT AT ALL
1. FEELING NERVOUS, ANXIOUS, OR ON EDGE: NOT AT ALL

## 2019-04-24 ASSESSMENT — PATIENT HEALTH QUESTIONNAIRE - PHQ9
5. POOR APPETITE OR OVEREATING: NOT AT ALL
SUM OF ALL RESPONSES TO PHQ QUESTIONS 1-9: 0

## 2019-04-24 ASSESSMENT — MIFFLIN-ST. JEOR: SCORE: 2039.83

## 2019-04-24 NOTE — PROGRESS NOTES
SUBJECTIVE:   CC: Octavio Pereira is an 38 year old male who presents for preventative health visit.     Healthy Habits:     Getting at least 3 servings of Calcium per day:  Yes    Bi-annual eye exam:  Yes    Dental care twice a year:  Yes    Sleep apnea or symptoms of sleep apnea:  Sleep apnea    Diet:  Regular (no restrictions)    Frequency of exercise:  6-7 days/week    Duration of exercise:  30-45 minutes    Taking medications regularly:  Yes    Medication side effects:  Not applicable    PHQ-2 Total Score: 0    Additional concerns today:  Yes    HTN. No cardiac sx such as CP, palpitations, PND, orthopnea, ALBA or peripheral edema.    Depression. Feeling well. Meds are helping.    Intermittent GERALDO. Typically evening. Has tried to change diet, but still has sx at times.  No dysphagia sx.     Today's PHQ-2 Score:   PHQ-2 ( 1999 [employer]) 4/24/2019   Q1: Little interest or pleasure in doing things 0   Q2: Feeling down, depressed or hopeless 0   PHQ-2 Score 0   Q1: Little interest or pleasure in doing things Not at all   Q2: Feeling down, depressed or hopeless Not at all   PHQ-2 Score 0       Abuse: Current or Past(Physical, Sexual or Emotional)- No  Do you feel safe in your environment? Yes    Social History     Tobacco Use     Smoking status: Never Smoker     Smokeless tobacco: Never Used   Substance Use Topics     Alcohol use: Yes     Alcohol/week: 0.0 oz     Comment: socially     If you drink alcohol do you typically have >3 drinks per day or >7 drinks per week? No    Alcohol Use 4/24/2019   Prescreen: >3 drinks/day or >7 drinks/week? No   Prescreen: >3 drinks/day or >7 drinks/week? -     Reviewed orders with patient. Reviewed health maintenance and updated orders accordingly - Yes  Labs reviewed in EPIC    Reviewed and updated as needed this visit by Provider  Tobacco  Allergies  Meds  Problems  Med Hx  Surg Hx  Fam Hx            Review of Systems   Constitutional: Negative for chills and fever.   HENT:  "Negative for congestion, ear pain, hearing loss and sore throat.    Eyes: Negative for pain and visual disturbance.   Respiratory: Negative for cough and shortness of breath.    Cardiovascular: Negative for chest pain, palpitations and peripheral edema.   Gastrointestinal: Negative for abdominal pain, constipation, diarrhea, heartburn, hematochezia and nausea.   Genitourinary: Negative for discharge, dysuria, frequency, genital sores, hematuria, impotence and urgency.   Musculoskeletal: Negative for arthralgias, joint swelling and myalgias.   Skin: Negative for rash.   Neurological: Negative for dizziness, weakness, headaches and paresthesias.   Psychiatric/Behavioral: Negative for mood changes. The patient is not nervous/anxious.        OBJECTIVE:   /78 (BP Location: Right arm, Patient Position: Sitting, Cuff Size: Adult Regular)   Pulse 80   Temp 96.2  F (35.7  C) (Tympanic)   Ht 1.956 m (6' 5\")   Wt 100.2 kg (221 lb)   SpO2 98%   BMI 26.21 kg/m      Physical Exam  GENERAL: healthy, alert and no distress  EYES: Eyes grossly normal to inspection, PERRL and conjunctivae and sclerae normal  HENT: ear canals and TM's normal, nose and mouth without ulcers or lesions  NECK: no adenopathy, no asymmetry, masses, or scars and thyroid normal to palpation  RESP: lungs clear to auscultation - no rales, rhonchi or wheezes  CV: regular rate and rhythm, normal S1 S2, no S3 or S4, no murmur, click or rub, no peripheral edema and peripheral pulses strong  ABDOMEN: soft, nontender, no hepatosplenomegaly, no masses and bowel sounds normal  MS: no gross musculoskeletal defects noted, no edema  SKIN: no suspicious lesions or rashes  NEURO: Normal strength and tone, mentation intact and speech normal  PSYCH: mentation appears normal, affect normal/bright    ASSESSMENT/PLAN:       ICD-10-CM    1. Routine general medical examination at a health care facility Z00.00 Lipid Profile (Chol, Trig, HDL, LDL calc)     Basic metabolic " "panel   2. Major depressive disorder, single episode, mild (H) F32.0 buPROPion (WELLBUTRIN XL) 300 MG 24 hr tablet     citalopram (CELEXA) 20 MG tablet   3. BENIGN HYPERTENSION I10 lisinopril (PRINIVIL/ZESTRIL) 20 MG tablet   4. JOSÉ MIGUEL (obstructive sleep apnea) G47.33    5. Gastroesophageal reflux disease without esophagitis K21.9 famotidine (PEPCID) 40 MG tablet       COUNSELING:   Reviewed preventive health counseling, as reflected in patient instructions    BP Readings from Last 1 Encounters:   04/24/19 118/78     Estimated body mass index is 26.21 kg/m  as calculated from the following:    Height as of this encounter: 1.956 m (6' 5\").    Weight as of this encounter: 100.2 kg (221 lb).      Weight management plan: Discussed healthy diet and exercise guidelines     reports that he has never smoked. He has never used smokeless tobacco.      Francis Morales MD  [facility]  "

## 2019-04-25 ASSESSMENT — ANXIETY QUESTIONNAIRES: GAD7 TOTAL SCORE: 0

## 2019-11-03 DIAGNOSIS — G25.81 RESTLESS LEGS: ICD-10-CM

## 2019-11-03 NOTE — TELEPHONE ENCOUNTER
"Requested Prescriptions   Pending Prescriptions Disp Refills     rOPINIRole (REQUIP) 0.5 MG tablet [Pharmacy Med Name: rOPINIRole HCl Oral Tablet 0.5 MG]  Last Written Prescription Date:  12/13/2017  Last Fill Quantity: 30 tablet,  # refills: 1   Last Office Visit: 4/24/2019 Francis Morales MD   Future Office Visit:      30 tablet 0     Sig: Take 1 tablet (0.5 mg) by mouth nightly as needed       Antiparkinson's Agents Protocol Failed - 11/3/2019  9:31 AM        Failed - CBC on record in past 12 months     Recent Labs   Lab Test 03/14/16  0902   HGB 14.6                 Failed - ALT on record in past 12 months         No lab results found.          Failed - Recent (6 mo) or future (30 days) visit within the authorizing provider's specialty     Patient had office visit in the last 6 months or has a visit in the next 30 days with authorizing provider or within the authorizing provider's specialty.  See \"Patient Info\" tab in inbasket, or \"Choose Columns\" in Meds & Orders section of the refill encounter.            Passed - Blood pressure under 140/90 in past 12 months     BP Readings from Last 3 Encounters:   04/24/19 118/78   05/28/18 130/82   12/13/17 122/80                 Passed - Serum Creatinine on file in past 12 months     Recent Labs   Lab Test 04/24/19  0811   CR 1.26*             Passed - Medication is active on med list        Passed - Patient is age 18 or older          "

## 2019-11-04 RX ORDER — ROPINIROLE 0.5 MG/1
0.5 TABLET, FILM COATED ORAL
Qty: 30 TABLET | Refills: 11 | Status: SHIPPED | OUTPATIENT
Start: 2019-11-04 | End: 2020-08-28

## 2019-12-02 ENCOUNTER — VIRTUAL VISIT (OUTPATIENT)
Dept: FAMILY MEDICINE | Facility: OTHER | Age: 39
End: 2019-12-02

## 2019-12-02 NOTE — PROGRESS NOTES
"Date: 2019 10:22:00  Clinician: Tanvir Arreola  Clinician NPI: [id number]  Patient: Octavio Pereira  Patient : 1980  Patient Address: [address] [place], [place] [address]  Patient Phone: [phone]  Visit Protocol: Ear pain  Patient Summary:  Octavio is a 39 year old ( : 1980 ) male who initiated a Visit for swimmer's ear (ear pain). When asked the question \"Please sign me up to receive news, health information and promotions from [employer].\", Octavio responded \"No\".    Octavio reports that his ear pain started yesterday. The ear pain is located inside the left ear.   In addition to the ear pain, Octavio is experiencing a feeling of fullness in the ear(s). Octavio reports having fluid draining from the ear(s).   Symptom Details     Pain: Octavio is experiencing moderate pain (4-6 on a 10 point pain scale). It gets worse when he eats or chews and gently pulls on the earlobe(s).     Drainage: The color of the fluid coming out of his ear(s) is blood-tinged. The fluid is malodorous.      Octavio denies tenderness, redness, and itchiness in the ear(s). He also denies the possibility of a foreign object in the ear(s), recent injuries near the ear(s), and feeling feverish. Octavio denies swimming and flying within the past week.   He has had tubes placed in his ear(s) to drain fluid but is not sure if they are still in place.   Weight: 200 lbs   He does not smoke or use smokeless tobacco.     MEDICATIONS: citalopram oral, Wellbutrin XL oral, lisinopril oral, ALLERGIES: Sulfa (Sulfonamide Antibiotics)  Clinician Response:  Dear Octavio,  Based upon the information you provided, you may have otitis externa. External otitis, also known as swimmer's ear, is a condition that occurs when the ear canal becomes irritated or infected.   I am prescribing:   Neomycin-polymyxin-hydrocortisone 1% otic ear drops. Instill 4 drops into affected ear(s) 3 times per day for 7 days. There are no refills with this prescription.   Instructions for " using eardrops:     Lie on your side or tilt your head    Insert the drops into your ear canal    Lie on your side or insert a cotton ball in the ear canal for 5 minutes    Use the medication for the number of days recommended, even if you begin to feel better within a few days after starting the drops     Avoid getting water inside your ear while you are being treated for swimmer's ear.  Use a cotton ball coated with petroleum jelly to protect your ears when bathing and showering.  Do not go swimming or diving for the next 7-10 days.  Do not wear headphones or hearing aid in the infected ears until your symptoms improve.  Please see your healthcare provider or urgent care clinic if your symptoms do not improve within 2 days.   Diagnosis: Otitis externa  Diagnosis ICD: H60.399  Prescription: neomycin-polymyxin-HC 3.5-10,000-1 mg/mL-unit/mL-% otic (ear) drops,suspension 1 10 ml dropper bottle, 7 days supply. Instill 4 drops into affected ear(s) 3 times per day for 7 days. Refills: 0, Refill as needed: no, Allow substitutions: yes  Pharmacy: [id number] - [phone] - [address], [place] [address]

## 2020-05-04 ENCOUNTER — VIRTUAL VISIT (OUTPATIENT)
Dept: PEDIATRICS | Facility: CLINIC | Age: 40
End: 2020-05-04
Payer: COMMERCIAL

## 2020-05-04 DIAGNOSIS — K21.9 GASTROESOPHAGEAL REFLUX DISEASE WITHOUT ESOPHAGITIS: ICD-10-CM

## 2020-05-04 DIAGNOSIS — F32.0 MAJOR DEPRESSIVE DISORDER, SINGLE EPISODE, MILD (H): ICD-10-CM

## 2020-05-04 DIAGNOSIS — I10 ESSENTIAL HYPERTENSION, BENIGN: ICD-10-CM

## 2020-05-04 PROCEDURE — 99213 OFFICE O/P EST LOW 20 MIN: CPT | Mod: TEL | Performed by: INTERNAL MEDICINE

## 2020-05-04 RX ORDER — BUPROPION HYDROCHLORIDE 300 MG/1
300 TABLET ORAL EVERY MORNING
Qty: 90 TABLET | Refills: 3 | Status: SHIPPED | OUTPATIENT
Start: 2020-05-04 | End: 2021-07-06

## 2020-05-04 RX ORDER — CITALOPRAM HYDROBROMIDE 20 MG/1
20 TABLET ORAL DAILY
Qty: 90 TABLET | Refills: 3 | Status: SHIPPED | OUTPATIENT
Start: 2020-05-04 | End: 2021-07-06

## 2020-05-04 RX ORDER — LISINOPRIL 20 MG/1
20 TABLET ORAL DAILY
Qty: 90 TABLET | Refills: 3 | Status: SHIPPED | OUTPATIENT
Start: 2020-05-04 | End: 2021-07-06

## 2020-05-04 ASSESSMENT — ANXIETY QUESTIONNAIRES
5. BEING SO RESTLESS THAT IT IS HARD TO SIT STILL: SEVERAL DAYS
6. BECOMING EASILY ANNOYED OR IRRITABLE: SEVERAL DAYS
3. WORRYING TOO MUCH ABOUT DIFFERENT THINGS: NOT AT ALL
7. FEELING AFRAID AS IF SOMETHING AWFUL MIGHT HAPPEN: NOT AT ALL
2. NOT BEING ABLE TO STOP OR CONTROL WORRYING: NOT AT ALL
IF YOU CHECKED OFF ANY PROBLEMS ON THIS QUESTIONNAIRE, HOW DIFFICULT HAVE THESE PROBLEMS MADE IT FOR YOU TO DO YOUR WORK, TAKE CARE OF THINGS AT HOME, OR GET ALONG WITH OTHER PEOPLE: NOT DIFFICULT AT ALL
1. FEELING NERVOUS, ANXIOUS, OR ON EDGE: NOT AT ALL
GAD7 TOTAL SCORE: 3

## 2020-05-04 ASSESSMENT — PATIENT HEALTH QUESTIONNAIRE - PHQ9
SUM OF ALL RESPONSES TO PHQ QUESTIONS 1-9: 1
5. POOR APPETITE OR OVEREATING: SEVERAL DAYS

## 2020-05-04 NOTE — PROGRESS NOTES
"Octavio Pereira is a 39 year old male who is being evaluated via a billable telephone visit.      The patient has been notified of following:     \"This telephone visit will be conducted via a call between you and your physician/provider. We have found that certain health care needs can be provided without the need for a physical exam.  This service lets us provide the care you need with a short phone conversation.  If a prescription is necessary we can send it directly to your pharmacy.  If lab work is needed we can place an order for that and you can then stop by our lab to have the test done at a later time.    Telephone visits are billed at different rates depending on your insurance coverage. During this emergency period, for some insurers they may be billed the same as an in-person visit.  Please reach out to your insurance provider with any questions.    If during the course of the call the physician/provider feels a telephone visit is not appropriate, you will not be charged for this service.\"    Patient has given verbal consent for Telephone visit?  Yes    What phone number would you like to be contacted at? [phone]    How would you like to obtain your AVS? MyChart    Subjective     Octavio Pereira is a 39 year old male who scheduled a telephone visit for the following health issues:    HPI     HTN.   Significant weight loss, relates is down 50# since last visit    Checking BP at [facility] - noting lower readings previously.  120's/80's currently.    BP Readings from Last 3 Encounters:   04/24/19 118/78   05/28/18 130/82   12/13/17 122/80     Discussed the possibility of decreasing lisinopril dose to 10 mg per day.    Depression. Feels that his mood overall is controlled.  Was having issues w/ insomnia a few weeks ago.   Felt that his muscles were \"tight\".   No significant help w/ ropinirole.     Does exercise 1 hour daily.     Reviewed ropinirole dose, could increase to 1-2 mg.           Objective   Reported " vitals:  There were no vitals taken for this visit.   PSYCH: Alert and oriented times 3; coherent speech, normal   rate and volume, able to articulate logical thoughts, able   to abstract reason, no tangential thoughts, no hallucinations   or delusions  His affect is normal  RESP: No cough, no audible wheezing, able to talk in full sentences  Remainder of exam unable to be completed due to telephone visits            Assessment/Plan:    ICD-10-CM    1. Major depressive disorder, single episode, mild (H)  F32.0 buPROPion (WELLBUTRIN XL) 300 MG 24 hr tablet     citalopram (CELEXA) 20 MG tablet   2. Gastroesophageal reflux disease without esophagitis  K21.9    3. BENIGN HYPERTENSION  I10 lisinopril (ZESTRIL) 20 MG tablet     CPM  If BP goes lower, can change to 10 mg lisinopril  OK to increase ropinirole to 1-2 mg if needed    Return in about 1 year (around 5/4/2021) for Routine Visit.      Phone call duration:  14 minutes    Francis Morales MD

## 2020-05-05 ASSESSMENT — ANXIETY QUESTIONNAIRES: GAD7 TOTAL SCORE: 3

## 2020-06-25 ENCOUNTER — TELEPHONE (OUTPATIENT)
Dept: PEDIATRICS | Facility: CLINIC | Age: 40
End: 2020-06-25

## 2020-06-25 DIAGNOSIS — M54.12 CERVICAL RADICULOPATHY: Primary | ICD-10-CM

## 2020-06-25 NOTE — TELEPHONE ENCOUNTER
General Call:   Who is calling:  Patient  Reason for Call:  Wants to talk to nurse about appointment  What are your questions or concerns:  Would not say, only wants to speak to a nurse  Date of last appointment with provider:  05/04 virtual  Okay to leave a detailed message:Yes at Home number on file [phone] (home)

## 2020-06-25 NOTE — TELEPHONE ENCOUNTER
-    He is having some trouble with his C-6 and 7 spine. Had surgery 5 years ago. He has pain in his left shoulder to elbow. He had a herniated disc in the past and surgery corrected it.     He has appt 7/10 with a new MD.    Wondering if he can have you order MRI so he has that when he goes in.     Should he do virtual visit with you to discuss? Faith Hummel RN on 6/25/2020 at 11:08 AM

## 2020-06-26 NOTE — TELEPHONE ENCOUNTER
Please call:  I can order the MRI.  He will be contacted by radiology scheduling to set up the exam.

## 2020-06-26 NOTE — TELEPHONE ENCOUNTER
Called patient and left a message informing him orders have been placed, and that he should be hearing from scheduling to set up imaging appointment.  Instructed patient to call the clinic if he does not hear from them or has additional questions or concerns.  Provided clinic number.  Additionally, provided scheduling number for [facility], if he would like to contact them directly for scheduling.     Izabela Palafox

## 2020-06-30 NOTE — TELEPHONE ENCOUNTER
Patient calling stating that he needs the MRI referral to go to [facility] in [place] instead.    Yenifer Shannon,

## 2020-06-30 NOTE — TELEPHONE ENCOUNTER
MRI orders faxed to [facility]- [phone].  Requested [facility] contact patient directly for scheduling. Spoke with patient and confirmed orders have been faxed.  Instructed patient to contact [facility] directly if he does not hear from them in a timely manner.  Patient confirmed.     Izabela Palafox

## 2020-07-01 ENCOUNTER — OFFICE VISIT (OUTPATIENT)
Dept: PEDIATRICS | Facility: CLINIC | Age: 40
End: 2020-07-01
Payer: COMMERCIAL

## 2020-07-01 VITALS
SYSTOLIC BLOOD PRESSURE: 124 MMHG | OXYGEN SATURATION: 97 % | BODY MASS INDEX: 24.78 KG/M2 | HEART RATE: 91 BPM | WEIGHT: 209 LBS | DIASTOLIC BLOOD PRESSURE: 68 MMHG | TEMPERATURE: 97.9 F

## 2020-07-01 DIAGNOSIS — N45.1 EPIDIDYMITIS, RIGHT: Primary | ICD-10-CM

## 2020-07-01 PROCEDURE — 99213 OFFICE O/P EST LOW 20 MIN: CPT | Performed by: INTERNAL MEDICINE

## 2020-07-01 RX ORDER — DOXYCYCLINE HYCLATE 100 MG
100 TABLET ORAL 2 TIMES DAILY
Qty: 20 TABLET | Refills: 0 | Status: SHIPPED | OUTPATIENT
Start: 2020-07-01 | End: 2020-07-11

## 2020-07-01 NOTE — PATIENT INSTRUCTIONS
1) They will call you to set up ultrasound- likely epididymitis- inflammation of the tubes coming off the testicle  If not, call [facility] at [phone] to arrange your study.        2) Doxycycline twice per day for 10 days for treatment of this, be sure to wear sun screen while on this as you can get sun burn.    If not improving or worsening let us know. Would change to ciprofloxacin if not responding to doxycycline.    Johnson Jorge MD

## 2020-07-01 NOTE — PROGRESS NOTES
Subjective     Octavio Pereira is a 40 year old male who presents to clinic today for the following health issues:    HPI       ABDOMINAL   PAIN     Onset: 3 days     Description:   Character: Burning  Location: pelvic region  Radiation: Pelvic region    Intensity: mild, moderate, at its worst 7/10, right now 7/10    Progression of Symptoms:  worsening    Accompanying Signs & Symptoms:  Fever/Chills?: no   Gas/Bloating: no   Nausea: no   Vomitting: no   Diarrhea?: no   Constipation:no   Dysuria or Hematuria: no    History:   Trauma: no   Previous similar pain: no    Previous tests done: none    Precipitating factors:   Does the pain change with:     Food: no      BM: no     Urination: no     Alleviating factors:  Nothing,     Therapies Tried and outcome: nothing     LMP:  not applicable     Noted on Sunday AM, hard to say if swelling. Not in testicle, more in groin area, has been doing heavy lifting, worse today with increased activity.    Did take some ibuprofen the other night. No constipation.        Patient Active Problem List   Diagnosis     GYNECOMASTIA     Hypertension     Mild Depression     CARDIOVASCULAR SCREENING; LDL GOAL LESS THAN 160     Blunt injury, left eye     JOSÉ MIGUEL (obstructive sleep apnea)     S/P cervical discectomy     Gastroesophageal reflux disease without esophagitis     Past Surgical History:   Procedure Laterality Date     FORAMINOTOMY CERVICAL POSTERIOR MINIMALLY INVASIVE ONE LEVEL Left 3/15/2016    Procedure: FORAMINOTOMY CERVICAL POSTERIOR MINIMALLY INVASIVE ONE LEVEL;  Surgeon: Reza Garvin MD;  Location: [facility] OR     ORTHOPEDIC SURGERY      thumb tendon repair     SURGICAL HISTORY OF -       Thumb tendon repair       Social History     Tobacco Use     Smoking status: Never Smoker     Smokeless tobacco: Never Used   Substance Use Topics     Alcohol use: Yes     Alcohol/week: 0.0 standard drinks     Comment: socially     Family History   Problem Relation Age of Onset     C.A.D. Father       Diabetes Father      Depression Father      C.A.D. Maternal Grandmother            Reviewed and updated as needed this visit by Provider         Review of Systems   Constitutional, HEENT, cardiovascular, pulmonary, GI, , musculoskeletal, neuro, skin, endocrine and psych systems are negative, except as otherwise noted.      Objective    /68 (BP Location: Right arm, Cuff Size: Adult Large)   Pulse 91   Temp 97.9  F (36.6  C) (Oral)   Wt 94.8 kg (209 lb)   SpO2 97%   BMI 24.78 kg/m    Body mass index is 24.78 kg/m .  Physical Exam   GENERAL: healthy, alert and no distress  EYES: Eyes grossly normal to inspection, PERRL and conjunctivae and sclerae normal  RESP: lungs clear to auscultation - no rales, rhonchi or wheezes  CV: regular rate and rhythm, normal S1 S2, no S3 or S4, no murmur, click or rub, no peripheral edema and peripheral pulses strong  ABDOMEN: soft, nontender, no hepatosplenomegaly, no masses and bowel sounds normal   (male): tender right testicle near proximal epididymus with palpable nodule- small, no hernia noted  MS: no gross musculoskeletal defects noted, no edema    Diagnostic Test Results:  Labs reviewed in Epic        Assessment & Plan       ICD-10-CM    1. Epididymitis, right  N45.1 doxycycline hyclate (VIBRA-TABS) 100 MG tablet     US Testicular & Scrotum w [facility]   will get US to evaluate as well with small nodule noted.    Patient Instructions   1) They will call you to set up ultrasound- likely epididymitis- inflammation of the tubes coming off the testicle  If not, call [facility] at [phone] to arrange your study.        2) Doxycycline twice per day for 10 days for treatment of this, be sure to wear sun screen while on this as you can get sun burn.    If not improving or worsening let us know. Would change to ciprofloxacin if not responding to doxycycline.    Johnson Jorge MD              Return in about 1 week (around 7/8/2020), or if symptoms worsen or  fail to improve.    Johnson Jorge MD  [facility]

## 2020-07-06 NOTE — TELEPHONE ENCOUNTER
Patient called stating [facility] still has not received fax. Patient provided this writer with new fax number.  This writer re-faxed orders to [phone].      Izabela Palafox

## 2020-07-06 NOTE — TELEPHONE ENCOUNTER
Patient called stating [facility] have not received MRI orders (originally faxed 06/30/20).  Informed patient orders will be re-faxed immediately. Instructed patient to call the clinic back, if [facility] does not receive it within the hour.  Patient verbalized understanding.  Orders re-faxed to [facility] to fax number provided.    Izabela Palafox

## 2020-07-07 NOTE — TELEPHONE ENCOUNTER
[facility] called again with new fax number to send order.  Order faxed to [phone].    Izabela Palafox

## 2020-07-07 NOTE — TELEPHONE ENCOUNTER
[place] [employer] called stating the [phone] fax is their fax number. They wanted to let us know the initial fax # the pt gave us was sent to them. Advised for them to please shred the received fax.    Elba Hillman on 7/7/2020 at 8:22 AM

## 2020-08-28 ENCOUNTER — OFFICE VISIT (OUTPATIENT)
Dept: URGENT CARE | Facility: URGENT CARE | Age: 40
End: 2020-08-28
Payer: COMMERCIAL

## 2020-08-28 VITALS
SYSTOLIC BLOOD PRESSURE: 124 MMHG | OXYGEN SATURATION: 98 % | RESPIRATION RATE: 16 BRPM | BODY MASS INDEX: 24.78 KG/M2 | WEIGHT: 209 LBS | TEMPERATURE: 98.5 F | HEART RATE: 69 BPM | DIASTOLIC BLOOD PRESSURE: 78 MMHG

## 2020-08-28 DIAGNOSIS — S81.811A LACERATION OF RIGHT LOWER EXTREMITY, INITIAL ENCOUNTER: Primary | ICD-10-CM

## 2020-08-28 PROCEDURE — 12002 RPR S/N/AX/GEN/TRNK2.6-7.5CM: CPT | Performed by: FAMILY MEDICINE

## 2020-08-28 NOTE — PATIENT INSTRUCTIONS
Sutures out in 12 days.  Patient Education     Extremity Laceration: Stitches, Staples, or Tape  A laceration is a cut through the skin. If it is deep, it may require stitches or staples to close so it can heal. Minor cuts may be treated with surgical tape closures, or skin glue.  X-rays may be done if something may have entered the skin through the cut. You may also need a tetanus shot if you are not up to date on this vaccine.  Home care    Follow the healthcare provider s instructions on how to care for the cut.    Wash your hands with soap and warm water before and after caring for your wound. This is to help prevent infection.    Keep the wound clean and dry. If a bandage was applied and it becomes wet or dirty, replace it. Otherwise, leave it in place for the first 24 hours, then change it once a day or as directed.    If stitches or staples were used, clean the wound daily:  ? After removing the bandage, wash the area with soap and water. Use a wet cotton swab to loosen and remove any blood or crust that forms.  ? After cleaning, keep the wound clean and dry. Talk with your healthcare provider before putting any antibiotic ointment on the wound. Reapply the bandage.    You may remove the bandage to shower as usual after the first 24 hours, but don't soak the area in water (no swimming) until the stitches or staples are removed.    If surgical tape closures were used, keep the area clean and dry. If it becomes wet, blot it dry with a towel. Let the surgical tape fall off on its own.    The healthcare provider may prescribe an antibiotic cream or ointment to prevent infection. He or she may also prescribe an antibiotic pill. Don't stop taking this medicine until you have finished it all or the provider tells you to stop.    The provider may also prescribe medicine for pain. Follow the instructions for taking these medicines.    Don't do activities that may reopen your wound.  Follow-up care  Follow up with  your healthcare provider, or as advised. Most skin wounds heal within 10 days. But an infection may sometimes occur even with proper treatment. Check the wound daily for the signs of infection listed below. Stitches and staples should be removed within 7 to14 days. If surgical tape closures were used, you may remove them after 10 days if they have not fallen off by then.   When to seek medical advice  Call your healthcare provider right away if any of these occur:    Wound bleeding not controlled by direct pressure    Signs of infection, including increasing pain in the wound, increasing wound redness or swelling, or pus or bad odor coming from the wound    Fever of 100.4 F (38 C) or higher, or as directed by your healthcare provider    Stitches or staples come apart or fall out or surgical tape falls off before 7 days    Wound edges reopen    Wound changes colors    Numbness occurs around the wound     Decreased movement around the injured area  Date Last Reviewed: 7/1/2017 2000-2019 [employer]. [address] [place] [address], [place] [address]. All rights reserved. This information is not intended as a substitute for professional medical care. Always follow your healthcare professional's instructions.

## 2020-08-28 NOTE — PROGRESS NOTES
CHIEF COMPLAINT    Laceration R leg      HISTORY    He slipped and leg hit wood pile. Has a lac lateral R thigh.    TDAP is current.      Patient Active Problem List   Diagnosis     GYNECOMASTIA     Hypertension     Mild Depression     CARDIOVASCULAR SCREENING; LDL GOAL LESS THAN 160     Blunt injury, left eye     JOSÉ MIGUEL (obstructive sleep apnea)     S/P cervical discectomy     Gastroesophageal reflux disease without esophagitis     Current Outpatient Medications   Medication Sig Dispense Refill     buPROPion (WELLBUTRIN XL) 300 MG 24 hr tablet Take 1 tablet (300 mg) by mouth every morning 90 tablet 3     citalopram (CELEXA) 20 MG tablet Take 1 tablet (20 mg) by mouth daily 90 tablet 3     lisinopril (ZESTRIL) 20 MG tablet Take 1 tablet (20 mg) by mouth daily 90 tablet 3       REVIEW OF SYSTEMS    Unremarkable except as above.      Past Medical History:   Diagnosis Date     BENIGN HYPERTENSION 6/6/2007     DEPRESSIVE DISORDER NEC 6/6/2007     Sleep apnea          EXAM  /78   Pulse 69   Temp 98.5  F (36.9  C) (Oral)   Resp 16   Wt 94.8 kg (209 lb)   SpO2 98%   BMI 24.78 kg/m      Well appearing man, NAD.    R leg:  V shaped laceration 4 cm length.      Procedure - repair  Verbal consent obtained.  Anes - Local 1% Lido + Epi   10 ml  Washed with Hibiclens, irrigated with saline  Closed with 6 3-0 Nylon sutures.  Bandaged.  Well tolerated.      (Z98.393Z) Laceration of right lower extremity, initial encounter  (primary encounter diagnosis)  Comment:   Plan: REPAIR SUPERFICIAL, WOUND BODY 2.6-7.5 CM

## 2020-09-07 ENCOUNTER — HOSPITAL ENCOUNTER (EMERGENCY)
Facility: CLINIC | Age: 40
Discharge: HOME | End: 2020-09-07
Attending: EMERGENCY MEDICINE | Admitting: EMERGENCY MEDICINE
Payer: COMMERCIAL

## 2020-09-07 VITALS
WEIGHT: 205 LBS | TEMPERATURE: 98.7 F | DIASTOLIC BLOOD PRESSURE: 88 MMHG | RESPIRATION RATE: 16 BRPM | SYSTOLIC BLOOD PRESSURE: 129 MMHG | HEIGHT: 78 IN | HEART RATE: 63 BPM | OXYGEN SATURATION: 100 % | BODY MASS INDEX: 23.72 KG/M2

## 2020-09-07 DIAGNOSIS — S61.412A LACERATION OF LEFT HAND, FOREIGN BODY PRESENCE UNSPECIFIED, INITIAL ENCOUNTER: ICD-10-CM

## 2020-09-07 PROCEDURE — 12001 RPR S/N/AX/GEN/TRNK 2.5CM/<: CPT

## 2020-09-07 PROCEDURE — 99283 EMERGENCY DEPT VISIT LOW MDM: CPT

## 2020-09-07 RX ORDER — LIDOCAINE HYDROCHLORIDE AND EPINEPHRINE 10; 10 MG/ML; UG/ML
INJECTION, SOLUTION INFILTRATION; PERINEURAL
Status: DISCONTINUED
Start: 2020-09-07 | End: 2020-09-07 | Stop reason: HOSPADM

## 2020-09-07 ASSESSMENT — ENCOUNTER SYMPTOMS: WOUND: 1

## 2020-09-07 ASSESSMENT — MIFFLIN-ST. JEOR: SCORE: 1973.12

## 2020-09-07 NOTE — ED AVS SNAPSHOT
[facility] Emergency Department  [address]  [facility] [address]  Phone:  [phone]  Fax:  [phone]                                    Octavio Pereira   MRN: [id number]    Department:  [facility] Emergency Department   Date of Visit:  9/7/2020           After Visit Summary Signature Page    I have received my discharge instructions, and my questions have been answered. I have discussed any challenges I see with this plan with the nurse or doctor.    ..........................................................................................................................................  Patient/Patient Representative Signature      ..........................................................................................................................................  Patient Representative Print Name and Relationship to Patient    ..................................................               ................................................  Date                                   Time    ..........................................................................................................................................  Reviewed by Signature/Title    ...................................................              ..............................................  Date                                               Time          22EPIC Rev 08/18

## 2020-09-07 NOTE — ED PROVIDER NOTES
"  History   Chief Complaint  Laceration    HPI   Octavio Pereira is a 40 year old male who presents for evaluation of laceration to his left palm. The patient states that he was using a knife when he accidentally stuck himself in the hand. He reports that his Tdap is up to date and the [facility] shows that he last received one in 2018.      Allergies  Sulfa Drugs    Medications  Wellbutrin  Celexa  Zestril    Past Medical History  Gynecomastia  Hypertension  Depression  Hyperlipidemia  Left eye injury  JOSÉ MIGUEL  GERD    Past Surgical History  Foraminotomy cervical posterior minimally  Thumb tendon repair    Family History  CAD  Diabetes  Depression    Social History  Tobacco use: no  Alcohol use: socially   Drug use: no  Marital Status:      Review of Systems   Skin: Positive for wound (left palm).   All other systems reviewed and are negative.    Physical Exam     Patient Vitals for the past 24 hrs:   BP Temp Pulse Resp SpO2 Height Weight   09/07/20 1308 129/88 98.7  F (37.1  C) 63 16 100 % 1.981 m (6' 6\") 93 kg (205 lb)     Physical Exam  General: Patient is alert and interactive when I enter the room  Head:  The scalp, face, and head appear normal  Eyes:  Conjunctivae are normal  ENT:    The nose is normal    Pinnae are normal    External acoustic canals are normal  Neck:  Trachea midline  CV:  Pulses are normal.   Resp:  No respiratory distress   Musc:  Normal muscular tone    No major joint effusions    No asymmetric leg swelling    Good capillary refill noted  Skin:  No rash. 1 cm laceration to the left palm.  Neuro: Speech is normal and fluent. Face is symmetric.     Moving all extremities well.   Psych:  Awake. Alert.  Normal affect.  Appropriate interactions.    Emergency Department Course     Procedures    Laceration Repair        LACERATION:  A simple clean 1.0 cm laceration.      LOCATION:  Left palm      FUNCTION:  Distally sensation, circulation, motor and tendon function are intact.      ANESTHESIA:  " Lidocaine 1% with EPINEPHrine.       PREPARATION:  Irrigation and Scrubbing with Hibiclens      DEBRIDEMENT:  no debridement      CLOSURE:  Wound was closed with One Layer.  Skin closed with 2 x 4.0 Prolene using horizontal mattress sutures.    Emergency Department Course:  Past medical records, nursing notes, and vitals reviewed.    1420 I physically examined the patient as documented above.     I rechecked the patient and discussed the findings of their workup thus far.     Findings and plan explained to the Patient. Patient discharged home with instructions regarding supportive care, medications, and reasons to return. The importance of close follow-up was reviewed.     I personally answered all related questions prior to discharge.     Impression & Plan   Medical Decision Making:  Octavio Pereira is a 40 year old male who presents for evaluation of a laceration to the left thenar eminence.  The wound was carefully evaluated and explored.  The laceration was closed with prolene as noted above.  There is no evidence of muscular, tendon, or bony damage with this laceration.  No signs of foreign body.  Possible complications (infection, scarring) were reviewed with the patient.  Follow up with primary care as noted in the discharge section.      Diagnosis:    ICD-10-CM    1. Laceration of left hand, foreign body presence unspecified, initial encounter  S61.412A      Disposition:  Discharged to home.    Scribe Disclosure:  I, Timmy Ruelas, am serving as a scribe at 2:22 PM on 9/7/2020 to document services personally performed by Danish Brink MD based on my observations and the provider's statements to me.      Danish Brink MD  09/07/20 7498

## 2020-09-10 ENCOUNTER — ALLIED HEALTH/NURSE VISIT (OUTPATIENT)
Dept: NURSING | Facility: CLINIC | Age: 40
End: 2020-09-10
Payer: COMMERCIAL

## 2020-09-10 DIAGNOSIS — Z48.02 ENCOUNTER FOR REMOVAL OF SUTURES: Primary | ICD-10-CM

## 2020-09-10 PROCEDURE — 99207 ZZC NO CHARGE NURSE ONLY: CPT

## 2020-09-10 NOTE — PROGRESS NOTES
Octavio VEE Anatrosanna presents to the clinic for removal of sutures and sutures,staples, steri strips. The patient has had sutures in place for 12 days. There has been no patient reported signs or symptoms of infection or drainage. 7  sutures and sutures,staples, staple, steri strips are seen and located on the rightthigh. Tetanus status is up to date. All sutures and sutures,staples, steri strips were easily removed today. Routine wound care discussed by the RN or provider. The patient will follow up as needed.    Thank you  Wade Vincent RN on 9/10/2020 at 9:10 AM

## 2020-11-27 ENCOUNTER — OFFICE VISIT (OUTPATIENT)
Dept: INTERNAL MEDICINE | Facility: CLINIC | Age: 40
End: 2020-11-27
Payer: COMMERCIAL

## 2020-11-27 VITALS
DIASTOLIC BLOOD PRESSURE: 78 MMHG | HEART RATE: 72 BPM | RESPIRATION RATE: 18 BRPM | SYSTOLIC BLOOD PRESSURE: 118 MMHG | OXYGEN SATURATION: 99 % | WEIGHT: 207 LBS | TEMPERATURE: 97.9 F | BODY MASS INDEX: 23.92 KG/M2

## 2020-11-27 DIAGNOSIS — D17.30 LIPOMA OF SKIN AND SUBCUTANEOUS TISSUE: Primary | ICD-10-CM

## 2020-11-27 PROCEDURE — 99213 OFFICE O/P EST LOW 20 MIN: CPT | Performed by: PHYSICIAN ASSISTANT

## 2020-11-27 NOTE — PROGRESS NOTES
Subjective     Octavio Pereira is a 40 year old male who presents to clinic today for the following health issues:    HPI         Skin Lesion  Onset/Duration: 1 year  Description  Location: Right side back of head  Color: No color  Character: round, Raised  Itching: no  Bleeding:  no  Progression of Symptoms:  Worsening, growing     History:           Any previous history of skin cancer: no  Any family history of melanoma: no  Previous episodes of similar lesion: no  Precipitating or alleviating factors: None  Therapies tried and outcome: none              Objective    /78 (BP Location: Right arm, Patient Position: Chair, Cuff Size: Adult Regular)   Pulse 72   Temp 97.9  F (36.6  C) (Temporal)   Resp 18   Wt 93.9 kg (207 lb)   SpO2 99%   BMI 23.92 kg/m    Body mass index is 23.92 kg/m .  Physical Exam   GENERAL: healthy, alert and no distress  SKIN: cystic lesion on back of scalp, soft, and somewhat mobile     No results found for this or any previous visit (from the past 24 hour(s)).        Assessment & Plan     Lipoma of skin and subcutaneous tissue  - suspect lipoma, but not 100% sure, pt would like it removed either way, referral to general surgery as below   - GENERAL SURG ADULT REFERRAL; Future        No follow-ups on file.    JUSTINE Edouard [facility]

## 2020-12-03 ENCOUNTER — TRANSFERRED RECORDS (OUTPATIENT)
Dept: HEALTH INFORMATION MANAGEMENT | Facility: CLINIC | Age: 40
End: 2020-12-03

## 2020-12-07 ENCOUNTER — OFFICE VISIT (OUTPATIENT)
Dept: SURGERY | Facility: CLINIC | Age: 40
End: 2020-12-07
Payer: COMMERCIAL

## 2020-12-07 VITALS
BODY MASS INDEX: 23.95 KG/M2 | HEIGHT: 78 IN | RESPIRATION RATE: 16 BRPM | SYSTOLIC BLOOD PRESSURE: 110 MMHG | DIASTOLIC BLOOD PRESSURE: 76 MMHG | WEIGHT: 207 LBS

## 2020-12-07 DIAGNOSIS — R22.0 MASS OF SCALP: Primary | ICD-10-CM

## 2020-12-07 DIAGNOSIS — R22.31 ARM MASS, RIGHT: ICD-10-CM

## 2020-12-07 PROCEDURE — 99243 OFF/OP CNSLTJ NEW/EST LOW 30: CPT | Performed by: SURGERY

## 2020-12-07 ASSESSMENT — MIFFLIN-ST. JEOR: SCORE: 1982.2

## 2020-12-07 NOTE — PROGRESS NOTES
REVIEW OF SYSTEMS:    Constitutional: Negative    Cardiovascular: Negative    Respiratory: Negative    Endocrine:  Negative    Hematologic: Negative    Gastrointestinal: Negative    Genitourinary: Negative    Musculoskeletal: Negative      Integumentary / Breast : Negative    Neurologic: Negative

## 2020-12-07 NOTE — PROGRESS NOTES
I was asked by Rosmery Lee PA-C to evaluate this patient regarding a right posterior scalp mass.  The patient first noticed this about a year ago.  Initially, it was very subtle and is now more easily palpable.  It is achy and has been increasing somewhat in size.  The patient also incidentally notes a right arm antecubital mass which he attributes to an upper arm trauma from several years ago.    Past Medical History:   Diagnosis Date     BENIGN HYPERTENSION 6/6/2007     DEPRESSIVE DISORDER NEC 6/6/2007     Sleep apnea      Past Surgical History:   Procedure Laterality Date     FORAMINOTOMY CERVICAL POSTERIOR MINIMALLY INVASIVE ONE LEVEL Left 3/15/2016    Procedure: FORAMINOTOMY CERVICAL POSTERIOR MINIMALLY INVASIVE ONE LEVEL;  Surgeon: Reza Garvin MD;  Location: [facility] OR     ORTHOPEDIC SURGERY      thumb tendon repair     SURGICAL HISTORY OF -       Thumb tendon repair     Review of systems is negative for skin changes.  Negative for headaches.    Physical exam:   The patient is in no apparent distress.  Breathing is nonlabored.  The patient's right posterior scalp reveals a 1.5 cm soft mass.  This is in the occipital region and is nontender.  The mass is soft and consistent with benign tissue.  The patient's right arm reveals a 1 cm multilobulated lesion in the distal part of the antecubital fossa.  This is of fatty consistency.    Assessment and plan: This is a patient with an enlarging mass in his right occipital region.  This is soft and is almost certainly benign, but it has been increasing in size and symptoms.  I offered excision and we discussed the procedure, along with its risks and complications, in detail.  The patient is leaning would be towards a local anesthesia with sedation.  We could certainly also consider excision of the patient's right arm lesion at the same time if he wanted to.  At this point, the patient is going to think about whether he wants to proceed with excision.  If he  calls back in the next 6 months or so, we could certainly just put him on the schedule.  If he has any significant changes, he should return to see me.    Aaron Steven MD  [facility]    Please route or send letter to:  Primary Care Provider (PCP) and Referring Provider

## 2021-01-15 ENCOUNTER — HEALTH MAINTENANCE LETTER (OUTPATIENT)
Age: 41
End: 2021-01-15

## 2021-07-05 DIAGNOSIS — I10 ESSENTIAL HYPERTENSION, BENIGN: ICD-10-CM

## 2021-07-05 DIAGNOSIS — F32.0 MAJOR DEPRESSIVE DISORDER, SINGLE EPISODE, MILD (H): ICD-10-CM

## 2021-07-06 RX ORDER — LISINOPRIL 20 MG/1
20 TABLET ORAL DAILY
Qty: 90 TABLET | Refills: 0 | Status: SHIPPED | OUTPATIENT
Start: 2021-07-06 | End: 2021-12-03

## 2021-07-06 RX ORDER — CITALOPRAM HYDROBROMIDE 20 MG/1
20 TABLET ORAL DAILY
Qty: 90 TABLET | Refills: 0 | Status: SHIPPED | OUTPATIENT
Start: 2021-07-06 | End: 2021-12-03

## 2021-07-06 RX ORDER — BUPROPION HYDROCHLORIDE 300 MG/1
300 TABLET ORAL EVERY MORNING
Qty: 90 TABLET | Refills: 0 | Status: SHIPPED | OUTPATIENT
Start: 2021-07-06 | End: 2021-12-03

## 2021-07-06 NOTE — TELEPHONE ENCOUNTER
Routing refill request to provider for review/approval because:  Labs not current:  All labs   Patient needs to be seen because it has been more than 1 year since last office visit.  Lisa Diaz RN, BSN

## 2021-08-23 ENCOUNTER — OFFICE VISIT (OUTPATIENT)
Dept: URGENT CARE | Facility: URGENT CARE | Age: 41
End: 2021-08-23
Payer: COMMERCIAL

## 2021-08-23 VITALS
HEART RATE: 65 BPM | WEIGHT: 223.9 LBS | DIASTOLIC BLOOD PRESSURE: 70 MMHG | BODY MASS INDEX: 25.87 KG/M2 | SYSTOLIC BLOOD PRESSURE: 120 MMHG | OXYGEN SATURATION: 99 % | TEMPERATURE: 97.6 F

## 2021-08-23 DIAGNOSIS — N50.89 TESTICULAR NODULE: ICD-10-CM

## 2021-08-23 DIAGNOSIS — N45.1 EPIDIDYMITIS: Primary | ICD-10-CM

## 2021-08-23 LAB
ALBUMIN UR-MCNC: NEGATIVE MG/DL
APPEARANCE UR: CLEAR
BILIRUB UR QL STRIP: NEGATIVE
COLOR UR AUTO: YELLOW
GLUCOSE UR STRIP-MCNC: NEGATIVE MG/DL
HGB UR QL STRIP: NEGATIVE
KETONES UR STRIP-MCNC: ABNORMAL MG/DL
LEUKOCYTE ESTERASE UR QL STRIP: NEGATIVE
NITRATE UR QL: NEGATIVE
PH UR STRIP: 6.5 [PH] (ref 5–7)
SP GR UR STRIP: 1.02 (ref 1–1.03)
UROBILINOGEN UR STRIP-ACNC: 0.2 E.U./DL

## 2021-08-23 PROCEDURE — 99214 OFFICE O/P EST MOD 30 MIN: CPT | Performed by: FAMILY MEDICINE

## 2021-08-23 PROCEDURE — 81003 URINALYSIS AUTO W/O SCOPE: CPT | Performed by: FAMILY MEDICINE

## 2021-08-23 RX ORDER — DOXYCYCLINE HYCLATE 100 MG
100 TABLET ORAL 2 TIMES DAILY
Qty: 20 TABLET | Refills: 0 | Status: SHIPPED | OUTPATIENT
Start: 2021-08-23 | End: 2021-09-02

## 2021-08-23 NOTE — PATIENT INSTRUCTIONS
Start doxycycline for epididymitis    I have referred you to urology to further evaluate the nodule in your testicular area    If you do not get a call from them call   [facility] [address]   [address]   [facility] [address]   Phone: [phone]   Fax: [phone]           If fever worsening pain worsening swelling to ER        Patient Education     Epididymitis   Inflammation of the epididymis can cause pain and swelling in your scrotum. The epididymis is a small tube next to the testicle that stores sperm. Epididymitis is often caused by an infection. In sexually active men, it is often caused by a sexually transmitted infection (STI) such as chlamydia or gonorrhea. In boys and in men over 40, it can be from bacteria from other parts of the urinary tract (not an STI infection).  Symptoms may begin with pain in the lower belly (abdomen) or low back. The pain then spreads down into the scrotum. Often only one side is affected. The testicle and scrotum swell and become very painful and red. You may have fever and a burning when passing urine. Sometimes you may have a discharge from the penis.  Treatment is with antibiotics, and anti-inflammatory and pain medicines. The condition should get better over the first few days of treatment. But it will take several weeks for all the swelling and mild pain to go away. If your healthcare provider thinks that an STI is the cause, your sexual partners may need to be treated.  Home care  Here are some tips to help you care for yourself at home:    Support the scrotum. When lying down, place a rolled towel under the scrotum. When walking, use an athletic supporter or 2 pairs of jockey-style underwear.    To ease pain, put ice packs on the inflamed area. To make an ice pack, put ice cubes in a plastic bag that seals at the top. Wrap the bag in a clean, thin towel. Never put an ice pack directly on the skin.    Take pain medicine as directed. You  may use over-the-counter medicines to control pain, unless another medicine was given. If you have long-term (chronic) liver or kidney disease, talk with your healthcare provider before taking these medicines. Also talk with your provider if you've ever had a stomach ulcer or GI (gastrointestinal) bleeding.    Get some rest.  Rest in bed for the first few days until the fever, pain, and swelling get better. It may take several weeks for all of the swelling to go away.    Prevent constipation. Constipation can make you strain. This makes the pain worse. Prevent constipation by eating natural laxatives. These include prunes, fresh fruits, and whole-grain cereals. If needed, use a mild over-the-counter laxative for constipation. Mineral oil can be used to keep the stools soft.    Wait to have sex. Don't have sex until you have finished all treatment and all symptoms have cleared.    Take all medicine as directed. Don't miss any doses. And don't stop taking your medicine early, even if you feel better.  Follow-up care  Follow up with your healthcare provider, or as advised, to be sure you are responding correctly to treatment. If a culture was taken, you may call for the result as directed. A culture test can ensure that you are on the correct antibiotic.   When to seek medical advice  Call your healthcare provider right away if any of these occur:    Fever of 100.4 F (38 C) or higher, or as directed by your provider    More pain or swelling of the testicle after starting treatment    Pressure or pain in your bladder that gets worse    Unable to pass urine for 8 hours  [employer] last reviewed this educational content on 9/1/2019 2000-2021 [employer]. All rights reserved. This information is not intended as a substitute for professional medical care. Always follow your healthcare professional's instructions.

## 2021-08-23 NOTE — PROGRESS NOTES
Patient presents with:  Urgent Care  Testicular/scrotal Pain: 2 days sx right testicular pain, sharp pain hx similar episode tx none        Subjective     Octavio Pereira is a 41 year old male who presents to clinic today for the following health issues:    HPI     Genitourinary symptoms      Duration: 3 days    Description:  NO dysuria, frequency, nocturia,  hesitancy, hematuria, retention, incontinence, back pain, kidney stone, no hx of  UTI or  Prostatitis,   NO  lump in testicle, only  pain in testicle in the tube part,-right side,-epididymitis  7/1/2020   Nodule noted on exam, patient did not have ultrasound due to pain/swelling resolved     Intensity:  moderate    Accompanying signs and symptoms (fever/discharge/nausea/vomiting/back or abdominal pain):  None    History (frequent UTI's/kidney stones/prostate problems): None  Sexually active: YES-mongomous  partner-wife   Vasectomy 7 years ago    Precipitating or alleviating factors: None/touching area/hit area causes  pain    Therapies tried and outcome: none-no tylenol or ibu        Patient Active Problem List   Diagnosis     GYNECOMASTIA     Hypertension     Mild Depression     CARDIOVASCULAR SCREENING; LDL GOAL LESS THAN 160     Blunt injury, left eye     JOSÉ MIGUEL (obstructive sleep apnea)     S/P cervical discectomy     Gastroesophageal reflux disease without esophagitis     Past Surgical History:   Procedure Laterality Date     FORAMINOTOMY CERVICAL POSTERIOR MINIMALLY INVASIVE ONE LEVEL Left 3/15/2016    Procedure: FORAMINOTOMY CERVICAL POSTERIOR MINIMALLY INVASIVE ONE LEVEL;  Surgeon: Reza Garvin MD;  Location: [facility] OR     ORTHOPEDIC SURGERY      thumb tendon repair     SURGICAL HISTORY OF -       Thumb tendon repair       Social History     Tobacco Use     Smoking status: Never Smoker     Smokeless tobacco: Never Used   Substance Use Topics     Alcohol use: Yes     Alcohol/week: 0.0 standard drinks     Comment: socially     Family History   Problem Relation  "Age of Onset     C.A.D. Father      Diabetes Father      Depression Father      C.A.SHAYY. Maternal Grandmother            Current Outpatient Medications   Medication Sig Dispense Refill     buPROPion (WELLBUTRIN XL) 300 MG 24 hr tablet Take 1 tablet (300 mg) by mouth every morning 90 tablet 0     citalopram (CELEXA) 20 MG tablet Take 1 tablet (20 mg) by mouth daily 90 tablet 0     lisinopril (ZESTRIL) 20 MG tablet Take 1 tablet (20 mg) by mouth daily Office visit needed prior to additional refills. 90 tablet 0     Allergies   Allergen Reactions     Sulfa Drugs Rash             ROS are negative, except as otherwise noted HPI      Objective    /70   Pulse 65   Temp 97.6  F (36.4  C)   Wt 101.6 kg (223 lb 14.4 oz)   SpO2 99%   BMI 25.87 kg/m    Body mass index is 25.87 kg/m .  Physical Exam   GENERAL: healthy, alert and no distress  ABDOMEN: soft, nontender, no hepatosplenomegaly, no masses and bowel sounds normal   (male): normal male genitalia    left testicle normal without atrophy or masses,  Right testicle-  epididymal enlargement and tenderness with palpable small nodule \"BB\" size proximal epididymus no hydrocele present bilateral, no hernias bilateral and penis normal without urethral discharge  MS: no gross musculoskeletal defects noted, no edema  SKIN: no  rashes  NEURO: Normal strength and tone, mentation intact and speech normal      Diagnostic Test Results:  Labs reviewed in Epic  Results for orders placed or performed in visit on 08/23/21   UA Macro with Reflex to Micro and Culture - lab collect     Status: Abnormal    Specimen: Urine, Clean Catch   Result Value Ref Range    Color Urine Yellow Colorless, Straw, Light Yellow, Yellow    Appearance Urine Clear Clear    Glucose Urine Negative Negative mg/dL    Bilirubin Urine Negative Negative    Ketones Urine Trace (A) Negative mg/dL    Specific Gravity Urine 1.025 1.003 - 1.035    Blood Urine Negative Negative    pH Urine 6.5 5.0 - 7.0    Protein " Albumin Urine Negative Negative mg/dL    Urobilinogen Urine 0.2 0.2, 1.0 E.U./dL    Nitrite Urine Negative Negative    Leukocyte Esterase Urine Negative Negative    Narrative    Microscopic not indicated           ASSESSMENT/PLAN:      ICD-10-CM    1. Epididymitis  N45.1 doxycycline hyclate (VIBRA-TABS) 100 MG tablet     Adult Urology Referral   2. Testicular nodule  N50.89 doxycycline hyclate (VIBRA-TABS) 100 MG tablet     Adult Urology Referral       Reviewed medication instructions and side effects. Follow up if experiences side effects.     I reviewed supportive care, otc meds to use if needed, expected course, and signs of concern.  Follow up as needed or if he does not improve within 1-2  day(s) or if worsens in any way.  Reviewed red flag symptoms and is to go to the ER if experiences any of these.       On the day of the encounter, time spend on chart review, patient visit, review of testing, documentation and discussion with other providers was 30 minutes      The use of Dragon/PowerMic dictation services may have been used to construct the content in this note; any grammatical or spelling errors are non-intentional. Please contact the author of this note directly if you are in need of any clarification.     Patient Instructions       Start doxycycline for epididymitis    I have referred you to urology to further evaluate the nodule in your testicular area    If you do not get a call from them call   [facility] [address]   [address] [place] [id number]   Phone: [phone]   Fax: [phone]           If fever worsening pain worsening swelling to ER        Patient Education     Epididymitis   Inflammation of the epididymis can cause pain and swelling in your scrotum. The epididymis is a small tube next to the testicle that stores sperm. Epididymitis is often caused by an infection. In sexually active men, it is often caused by a sexually transmitted infection (STI) such as  chlamydia or gonorrhea. In boys and in men over 40, it can be from bacteria from other parts of the urinary tract (not an STI infection).  Symptoms may begin with pain in the lower belly (abdomen) or low back. The pain then spreads down into the scrotum. Often only one side is affected. The testicle and scrotum swell and become very painful and red. You may have fever and a burning when passing urine. Sometimes you may have a discharge from the penis.  Treatment is with antibiotics, and anti-inflammatory and pain medicines. The condition should get better over the first few days of treatment. But it will take several weeks for all the swelling and mild pain to go away. If your healthcare provider thinks that an STI is the cause, your sexual partners may need to be treated.  Home care  Here are some tips to help you care for yourself at home:    Support the scrotum. When lying down, place a rolled towel under the scrotum. When walking, use an athletic supporter or 2 pairs of jockey-style underwear.    To ease pain, put ice packs on the inflamed area. To make an ice pack, put ice cubes in a plastic bag that seals at the top. Wrap the bag in a clean, thin towel. Never put an ice pack directly on the skin.    Take pain medicine as directed. You may use over-the-counter medicines to control pain, unless another medicine was given. If you have long-term (chronic) liver or kidney disease, talk with your healthcare provider before taking these medicines. Also talk with your provider if you've ever had a stomach ulcer or GI (gastrointestinal) bleeding.    Get some rest.  Rest in bed for the first few days until the fever, pain, and swelling get better. It may take several weeks for all of the swelling to go away.    Prevent constipation. Constipation can make you strain. This makes the pain worse. Prevent constipation by eating natural laxatives. These include prunes, fresh fruits, and whole-grain cereals. If needed, use a  mild over-the-counter laxative for constipation. Mineral oil can be used to keep the stools soft.    Wait to have sex. Don't have sex until you have finished all treatment and all symptoms have cleared.    Take all medicine as directed. Don't miss any doses. And don't stop taking your medicine early, even if you feel better.  Follow-up care  Follow up with your healthcare provider, or as advised, to be sure you are responding correctly to treatment. If a culture was taken, you may call for the result as directed. A culture test can ensure that you are on the correct antibiotic.   When to seek medical advice  Call your healthcare provider right away if any of these occur:    Fever of 100.4 F (38 C) or higher, or as directed by your provider    More pain or swelling of the testicle after starting treatment    Pressure or pain in your bladder that gets worse    Unable to pass urine for 8 hours  Edilia last reviewed this educational content on 9/1/2019 2000-2021 [employer]. All rights reserved. This information is not intended as a substitute for professional medical care. Always follow your healthcare professional's instructions.

## 2021-09-03 ENCOUNTER — TELEPHONE (OUTPATIENT)
Dept: PEDIATRICS | Facility: CLINIC | Age: 41
End: 2021-09-03

## 2021-09-03 DIAGNOSIS — N45.1 EPIDIDYMITIS: Primary | ICD-10-CM

## 2021-09-03 NOTE — TELEPHONE ENCOUNTER
Order/Referral Request:    Who is requesting: Patient's wife     Orders being requested: requesting a new referral for ultrasound from 07/01/2020     Reason service is needed/diagnosis: N45.1 (ICD-10-CM)     When are orders needed by: asap, has the ultrasound on 09/14/2021    Has this been discussed with Provider: Yes    Does patient have a preference on a Group/Provider/Facility? n/a    Does patient have an appointment scheduled: Yes: with [facility]     Where to send Orders: N/A    Okay to leave detailed message?  Yes at Cell number on file:    Telephone Information:   Mobile [phone]       Mackenzie Faustin MA 4:06 PM 9/3/2021

## 2021-09-03 NOTE — TELEPHONE ENCOUNTER
Called patient and notified him order is signed. Pt had no further questions or concerns.    Mackenzie Faustin MA 4:24 PM 9/3/2021

## 2021-09-04 ENCOUNTER — HEALTH MAINTENANCE LETTER (OUTPATIENT)
Age: 41
End: 2021-09-04

## 2021-09-14 ENCOUNTER — OFFICE VISIT (OUTPATIENT)
Dept: UROLOGY | Facility: CLINIC | Age: 41
End: 2021-09-14
Attending: FAMILY MEDICINE
Payer: COMMERCIAL

## 2021-09-14 ENCOUNTER — HOSPITAL ENCOUNTER (OUTPATIENT)
Dept: ULTRASOUND IMAGING | Facility: CLINIC | Age: 41
Discharge: HOME | End: 2021-09-14
Attending: INTERNAL MEDICINE | Admitting: INTERNAL MEDICINE
Payer: COMMERCIAL

## 2021-09-14 VITALS
HEART RATE: 79 BPM | SYSTOLIC BLOOD PRESSURE: 120 MMHG | BODY MASS INDEX: 24.88 KG/M2 | DIASTOLIC BLOOD PRESSURE: 70 MMHG | WEIGHT: 215 LBS | HEIGHT: 78 IN | OXYGEN SATURATION: 99 %

## 2021-09-14 DIAGNOSIS — N50.89 TESTICULAR NODULE: ICD-10-CM

## 2021-09-14 DIAGNOSIS — N45.1 EPIDIDYMITIS: ICD-10-CM

## 2021-09-14 PROCEDURE — 76870 US EXAM SCROTUM: CPT

## 2021-09-14 PROCEDURE — 99203 OFFICE O/P NEW LOW 30 MIN: CPT | Performed by: PHYSICIAN ASSISTANT

## 2021-09-14 RX ORDER — CIPROFLOXACIN 500 MG/1
500 TABLET, FILM COATED ORAL 2 TIMES DAILY
Qty: 14 TABLET | COMMUNITY
Start: 2021-09-14 | End: 2022-02-04

## 2021-09-14 ASSESSMENT — MIFFLIN-ST. JEOR: SCORE: 2013.48

## 2021-09-14 ASSESSMENT — PAIN SCALES - GENERAL: PAINLEVEL: MILD PAIN (2)

## 2021-09-14 NOTE — PATIENT INSTRUCTIONS
Cipro 500mg BID x 7 days    Ibuprofen 600mg twice a day with breakfast and dinner.    Jock strap for support.     I think it is worth seeking an opinion from Dr. Horowitz at the  of  if your symptoms do not resolve. Please call [phone] to schedule an appointment if needed.

## 2021-09-14 NOTE — PROGRESS NOTES
CC: scrotal pain    HPI:  Octavio Pereira is a 41 year old male who presents in consultation from Dr. Jimenez for evaluation of the above. Has had 2-3 weeks of right scrotal pain. Hx of epididymitis in July and tx with Doxy x 10 days and symptoms resolved.  Seen again in Aug with similar complaints and given a course of Doxy. UA neg. Has intermittent pain, but not as frequent.    US today was normal.     Past Medical History:   Diagnosis Date     BENIGN HYPERTENSION 6/6/2007     DEPRESSIVE DISORDER NEC 6/6/2007     Sleep apnea        Past Surgical History:   Procedure Laterality Date     FORAMINOTOMY CERVICAL POSTERIOR MINIMALLY INVASIVE ONE LEVEL Left 3/15/2016    Procedure: FORAMINOTOMY CERVICAL POSTERIOR MINIMALLY INVASIVE ONE LEVEL;  Surgeon: Reza Garvin MD;  Location: [facility] OR     ORTHOPEDIC SURGERY      thumb tendon repair     SURGICAL HISTORY OF -       Thumb tendon repair       Social History     Socioeconomic History     Marital status:      Spouse name: Anisa     Number of children: 1     Years of education: Not on file     Highest education level: Not on file   Occupational History     Occupation:      Employer: [employer]     Comment: [employer]   Tobacco Use     Smoking status: Never Smoker     Smokeless tobacco: Never Used   Substance and Sexual Activity     Alcohol use: Yes     Alcohol/week: 0.0 standard drinks     Comment: socially     Drug use: No     Sexual activity: Yes     Partners: Female   Other Topics Concern     Parent/sibling w/ CABG, MI or angioplasty before 65F 55M? Not Asked   Social History Narrative     Not on file     Social Determinants of Health     Financial Resource Strain:      Difficulty of Paying Living Expenses:    Food Insecurity:      Worried About Running Out of Food in the Last Year:      Ran Out of Food in the Last Year:    Transportation Needs:      Lack of Transportation (Medical):      Lack of Transportation (Non-Medical):    Physical  Activity:      Days of Exercise per Week:      Minutes of Exercise per Session:    Stress:      Feeling of Stress :    Social Connections:      Frequency of Communication with Friends and Family:      Frequency of Social Gatherings with Friends and Family:      Attends Sikhism Services:      Active Member of Clubs or Organizations:      Attends Club or Organization Meetings:      Marital Status:    Intimate Partner Violence:      Fear of Current or Ex-Partner:      Emotionally Abused:      Physically Abused:      Sexually Abused:        Family History   Problem Relation Age of Onset     C.A.D. Father      Diabetes Father      Depression Father      C.A.D. Maternal Grandmother        Allergies   Allergen Reactions     Sulfa Drugs Rash       Current Outpatient Medications   Medication     buPROPion (WELLBUTRIN XL) 300 MG 24 hr tablet     citalopram (CELEXA) 20 MG tablet     lisinopril (ZESTRIL) 20 MG tablet     No current facility-administered medications for this visit.         PEx:   There were no vitals taken for this visit.    PSYCH: NAD  EYES: EOMI  MOUTH: MMM  NEURO: AAO x3  :      Circ penis, no penile plaques or lesions.      Orthotopic location of the urethral meatus.     Scrotum normal.     Testicles of normal firmness and consistency, no masses.     Epididymes bilaterally without masses, no tenderness with palpation.     Vas and cord normal bilaterally.    US TESTICULAR AND SCROTUM WITH DOPPLER LIMITED 9/14/2021 12:46 PM     CLINICAL HISTORY: Right-sided scrotal pain, history of epididymitis.  TECHNIQUE: Ultrasound of scrotum with color flow and spectral Doppler  with waveform analysis performed.     COMPARISON: None.     FINDINGS:     RIGHT: Right testicle measures 5.7 x 3.7 x 2.5 cm. Normal testicle  with no masses. Normal arterial duplex and normal color flow. Normal  epididymis. No hydrocele. No varicocele.     LEFT: Left testicle measures 5.5 x 3.1 x 2.3 cm. Normal testicle with  no masses. Normal  arterial duplex and normal color flow. Normal  epididymis. No hydrocele. No varicocele.                                                                      IMPRESSION:  1.  Normal ultrasound of the scrotum.    A/P: Octavio Pereira is a 41 year old male with right epididymitis, infectious vs inflammatory.   -Reassurance  -Scrotal support with athletic strap  -Cipro 500mg BID x 7 days  -Ibuprofen 600mg BID x 7 days with meals  -Dr. Horowitz if symptoms do not resolve.   Nilsa Gomez PA-C  [facility]    19 minutes spent on the date of the encounter doing chart review, review of outside records, review of test results, interpretation of tests, patient visit and documentation

## 2021-09-14 NOTE — LETTER
9/14/2021       RE: Octavio Pereira  [address] [address] [id number]     Dear Colleague,    Thank you for referring your patient, Octavio Pereira, to the [facility]. Please see a copy of my visit note below.    CC: scrotal pain    HPI:  Octavio Pereira is a 41 year old male who presents in consultation from Dr. Jimenez for evaluation of the above. Has had 2-3 weeks of right scrotal pain. Hx of epididymitis in July and tx with Doxy x 10 days and symptoms resolved.  Seen again in Aug with similar complaints and given a course of Doxy. UA neg. Has intermittent pain, but not as frequent.    US today was normal.     Past Medical History:   Diagnosis Date     BENIGN HYPERTENSION 6/6/2007     DEPRESSIVE DISORDER NEC 6/6/2007     Sleep apnea        Past Surgical History:   Procedure Laterality Date     FORAMINOTOMY CERVICAL POSTERIOR MINIMALLY INVASIVE ONE LEVEL Left 3/15/2016    Procedure: FORAMINOTOMY CERVICAL POSTERIOR MINIMALLY INVASIVE ONE LEVEL;  Surgeon: Reza Garvin MD;  Location: [facility] OR     ORTHOPEDIC SURGERY      thumb tendon repair     SURGICAL HISTORY OF -       Thumb tendon repair       Social History     Socioeconomic History     Marital status:      Spouse name: Anisa     Number of children: 1     Years of education: Not on file     Highest education level: Not on file   Occupational History     Occupation:      Employer: [employer]     Comment: [employer]   Tobacco Use     Smoking status: Never Smoker     Smokeless tobacco: Never Used   Substance and Sexual Activity     Alcohol use: Yes     Alcohol/week: 0.0 standard drinks     Comment: socially     Drug use: No     Sexual activity: Yes     Partners: Female   Other Topics Concern     Parent/sibling w/ CABG, MI or angioplasty before 65F 55M? Not Asked   Social History Narrative     Not on file     Social Determinants of  Health     Financial Resource Strain:      Difficulty of Paying Living Expenses:    Food Insecurity:      Worried About Running Out of Food in the Last Year:      Ran Out of Food in the Last Year:    Transportation Needs:      Lack of Transportation (Medical):      Lack of Transportation (Non-Medical):    Physical Activity:      Days of Exercise per Week:      Minutes of Exercise per Session:    Stress:      Feeling of Stress :    Social Connections:      Frequency of Communication with Friends and Family:      Frequency of Social Gatherings with Friends and Family:      Attends Hoahaoism Services:      Active Member of Clubs or Organizations:      Attends Club or Organization Meetings:      Marital Status:    Intimate Partner Violence:      Fear of Current or Ex-Partner:      Emotionally Abused:      Physically Abused:      Sexually Abused:        Family History   Problem Relation Age of Onset     C.A.D. Father      Diabetes Father      Depression Father      C.A.D. Maternal Grandmother        Allergies   Allergen Reactions     Sulfa Drugs Rash       Current Outpatient Medications   Medication     buPROPion (WELLBUTRIN XL) 300 MG 24 hr tablet     citalopram (CELEXA) 20 MG tablet     lisinopril (ZESTRIL) 20 MG tablet     No current facility-administered medications for this visit.         PEx:   There were no vitals taken for this visit.    PSYCH: NAD  EYES: EOMI  MOUTH: MMM  NEURO: AAO x3  :      Circ penis, no penile plaques or lesions.      Orthotopic location of the urethral meatus.     Scrotum normal.     Testicles of normal firmness and consistency, no masses.     Epididymes bilaterally without masses, no tenderness with palpation.     Vas and cord normal bilaterally.    US TESTICULAR AND SCROTUM WITH DOPPLER LIMITED 9/14/2021 12:46 PM     CLINICAL HISTORY: Right-sided scrotal pain, history of epididymitis.  TECHNIQUE: Ultrasound of scrotum with color flow and spectral Doppler  with waveform analysis  performed.     COMPARISON: None.     FINDINGS:     RIGHT: Right testicle measures 5.7 x 3.7 x 2.5 cm. Normal testicle  with no masses. Normal arterial duplex and normal color flow. Normal  epididymis. No hydrocele. No varicocele.     LEFT: Left testicle measures 5.5 x 3.1 x 2.3 cm. Normal testicle with  no masses. Normal arterial duplex and normal color flow. Normal  epididymis. No hydrocele. No varicocele.                                                                      IMPRESSION:  1.  Normal ultrasound of the scrotum.    A/P: Octavio Pereira is a 41 year old male with right epididymitis, infectious vs inflammatory.   -Reassurance  -Scrotal support with athletic strap  -Cipro 500mg BID x 7 days  -Ibuprofen 600mg BID x 7 days with meals  -Dr. Horowitz if symptoms do not resolve.   Nilsa Gomez PA-C  [facility]    19 minutes spent on the date of the encounter doing chart review, review of outside records, review of test results, interpretation of tests, patient visit and documentation

## 2022-02-04 ENCOUNTER — OFFICE VISIT (OUTPATIENT)
Dept: PEDIATRICS | Facility: CLINIC | Age: 42
End: 2022-02-04
Payer: COMMERCIAL

## 2022-02-04 VITALS
RESPIRATION RATE: 12 BRPM | OXYGEN SATURATION: 98 % | DIASTOLIC BLOOD PRESSURE: 72 MMHG | BODY MASS INDEX: 26.61 KG/M2 | HEIGHT: 77 IN | HEART RATE: 82 BPM | SYSTOLIC BLOOD PRESSURE: 114 MMHG | TEMPERATURE: 98.1 F | WEIGHT: 225.4 LBS

## 2022-02-04 DIAGNOSIS — Z00.00 ROUTINE GENERAL MEDICAL EXAMINATION AT A HEALTH CARE FACILITY: Primary | ICD-10-CM

## 2022-02-04 DIAGNOSIS — F32.0 MAJOR DEPRESSIVE DISORDER, SINGLE EPISODE, MILD (H): ICD-10-CM

## 2022-02-04 DIAGNOSIS — I10 ESSENTIAL HYPERTENSION, BENIGN: ICD-10-CM

## 2022-02-04 PROCEDURE — 99396 PREV VISIT EST AGE 40-64: CPT | Performed by: INTERNAL MEDICINE

## 2022-02-04 PROCEDURE — 96127 BRIEF EMOTIONAL/BEHAV ASSMT: CPT | Performed by: INTERNAL MEDICINE

## 2022-02-04 RX ORDER — LISINOPRIL 20 MG/1
20 TABLET ORAL DAILY
Qty: 90 TABLET | Refills: 4 | Status: SHIPPED | OUTPATIENT
Start: 2022-02-04 | End: 2023-04-17

## 2022-02-04 RX ORDER — BUPROPION HYDROCHLORIDE 300 MG/1
300 TABLET ORAL EVERY MORNING
Qty: 90 TABLET | Refills: 4 | Status: SHIPPED | OUTPATIENT
Start: 2022-02-04 | End: 2023-04-17

## 2022-02-04 RX ORDER — CITALOPRAM HYDROBROMIDE 20 MG/1
20 TABLET ORAL DAILY
Qty: 90 TABLET | Refills: 4 | Status: SHIPPED | OUTPATIENT
Start: 2022-02-04 | End: 2023-04-17

## 2022-02-04 ASSESSMENT — ENCOUNTER SYMPTOMS
SHORTNESS OF BREATH: 0
CHILLS: 0
PALPITATIONS: 0
NAUSEA: 0
HEMATOCHEZIA: 0
EYE PAIN: 0
DYSURIA: 0
WEAKNESS: 0
ARTHRALGIAS: 0
COUGH: 0
ABDOMINAL PAIN: 0
DIARRHEA: 0
DIZZINESS: 0
HEADACHES: 0
MYALGIAS: 0
PARESTHESIAS: 0
FEVER: 0
FREQUENCY: 0
CONSTIPATION: 0
HEARTBURN: 0
JOINT SWELLING: 0
SORE THROAT: 0
NERVOUS/ANXIOUS: 0
HEMATURIA: 0

## 2022-02-04 ASSESSMENT — PATIENT HEALTH QUESTIONNAIRE - PHQ9
SUM OF ALL RESPONSES TO PHQ QUESTIONS 1-9: 2
10. IF YOU CHECKED OFF ANY PROBLEMS, HOW DIFFICULT HAVE THESE PROBLEMS MADE IT FOR YOU TO DO YOUR WORK, TAKE CARE OF THINGS AT HOME, OR GET ALONG WITH OTHER PEOPLE: NOT DIFFICULT AT ALL
SUM OF ALL RESPONSES TO PHQ QUESTIONS 1-9: 2

## 2022-02-04 ASSESSMENT — MIFFLIN-ST. JEOR: SCORE: 2038.04

## 2022-02-04 NOTE — PROGRESS NOTES
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     Patient has been advised of split billing requirements and indicates understanding: Yes  Healthy Habits:     Getting at least 3 servings of Calcium per day:  Yes    Bi-annual eye exam:  Yes    Dental care twice a year:  NO    Sleep apnea or symptoms of sleep apnea:  Sleep apnea    Diet:  Regular (no restrictions)    Frequency of exercise:  1 day/week    Duration of exercise:  30-45 minutes    Taking medications regularly:  Yes    Medication side effects:  None    PHQ-2 Total Score: 0    Additional concerns today:  No    Today's PHQ-2 Score:   PHQ-2 ( 1999 [employer]) 2/4/2022   Q1: Little interest or pleasure in doing things 0   Q2: Feeling down, depressed or hopeless 0   PHQ-2 Score 0   PHQ-2 Total Score (12-17 Years)- Positive if 3 or more points; Administer PHQ-A if positive -   Q1: Little interest or pleasure in doing things Not at all   Q2: Feeling down, depressed or hopeless Not at all   PHQ-2 Score 0       Abuse: Current or Past(Physical, Sexual or Emotional)- No  Do you feel safe in your environment? Yes    Have you ever done Advance Care Planning? (For example, a Health Directive, POLST, or a discussion with a medical provider or your loved ones about your wishes): No, advance care planning information given to patient to review.  Advanced care planning was discussed at today's visit.    Social History     Tobacco Use     Smoking status: Never Smoker     Smokeless tobacco: Never Used   Substance Use Topics     Alcohol use: Yes     Alcohol/week: 0.0 standard drinks     Comment: socially     If you drink alcohol do you typically have >3 drinks per day or >7 drinks per week? No    Alcohol Use 2/4/2022   Prescreen: >3 drinks/day or >7 drinks/week? No   Prescreen: >3 drinks/day or >7 drinks/week? -     Reviewed orders with patient. Reviewed health maintenance and updated orders accordingly - Yes    Depression.  "Feels meds are working well.  Answers for HPI/ROS submitted by the patient on 2/4/2022  If you checked off any problems, how difficult have these problems made it for you to do your work, take care of things at home, or get along with other people?: Not difficult at all  PHQ9 TOTAL SCORE: 2    HTN. No cardiac sx such as CP, palpitations, PND, orthopnea, ALBA or peripheral edema.  BP Readings from Last 3 Encounters:   02/04/22 114/72   09/14/21 120/70   08/23/21 120/70         Review of Systems   Constitutional: Negative for chills and fever.   HENT: Negative for congestion, ear pain, hearing loss and sore throat.    Eyes: Negative for pain and visual disturbance.   Respiratory: Negative for cough and shortness of breath.    Cardiovascular: Negative for chest pain, palpitations and peripheral edema.   Gastrointestinal: Negative for abdominal pain, constipation, diarrhea, heartburn, hematochezia and nausea.   Genitourinary: Negative for dysuria, frequency, genital sores, hematuria, impotence, penile discharge and urgency.   Musculoskeletal: Negative for arthralgias, joint swelling and myalgias.   Skin: Negative for rash.   Neurological: Negative for dizziness, weakness, headaches and paresthesias.   Psychiatric/Behavioral: Negative for mood changes. The patient is not nervous/anxious.        OBJECTIVE:   /72 (BP Location: Right arm, Patient Position: Sitting, Cuff Size: Adult Large)   Pulse 82   Temp 98.1  F (36.7  C) (Tympanic)   Resp 12   Ht 1.945 m (6' 4.58\")   Wt 102.2 kg (225 lb 6.4 oz)   SpO2 98%   BMI 27.03 kg/m      Physical Exam  GENERAL: healthy, alert and no distress  EYES: PERRL, EOMI  HENT: ear canals and TM's normal right, PE tube left   NECK: no adenopathy  RESP: lungs clear to auscultation - no rales, rhonchi or wheezes  CV: regular rate and rhythm, normal S1 S2, no murmur, no peripheral edema and peripheral pulses strong  ABDOMEN: soft, nontender, bowel sounds normal  MS: no gross " "musculoskeletal defects noted, no edema  SKIN: no suspicious lesions or rashes  NEURO: Normal strength and tone  PSYCH: mentation appears normal, affect normal/bright        ASSESSMENT/PLAN:       ICD-10-CM    1. Routine general medical examination at a health care facility  Z00.00    2. Major depressive disorder, single episode, mild (H)  F32.0 citalopram (CELEXA) 20 MG tablet     buPROPion (WELLBUTRIN XL) 300 MG 24 hr tablet   3. BENIGN HYPERTENSION  I10 lisinopril (ZESTRIL) 20 MG tablet       COUNSELING:   Reviewed preventive health counseling, as reflected in patient instructions    Estimated body mass index is 27.03 kg/m  as calculated from the following:    Height as of this encounter: 1.945 m (6' 4.58\").    Weight as of this encounter: 102.2 kg (225 lb 6.4 oz).     Weight management plan: Discussed healthy diet and exercise guidelines    He reports that he has never smoked. He has never used smokeless tobacco.      Francis Morales MD  [facility]  "

## 2022-02-05 ASSESSMENT — PATIENT HEALTH QUESTIONNAIRE - PHQ9: SUM OF ALL RESPONSES TO PHQ QUESTIONS 1-9: 2

## 2022-10-14 ENCOUNTER — TRANSFERRED RECORDS (OUTPATIENT)
Dept: HEALTH INFORMATION MANAGEMENT | Facility: CLINIC | Age: 42
End: 2022-10-14

## 2022-10-22 ENCOUNTER — HEALTH MAINTENANCE LETTER (OUTPATIENT)
Age: 42
End: 2022-10-22

## 2023-01-02 NOTE — NURSING NOTE
Chief Complaint   Patient presents with     Epidiymitis     Testicular Nodule     Marielle Majano      used

## 2023-04-01 ENCOUNTER — HEALTH MAINTENANCE LETTER (OUTPATIENT)
Age: 43
End: 2023-04-01

## 2023-04-16 DIAGNOSIS — I10 ESSENTIAL HYPERTENSION, BENIGN: ICD-10-CM

## 2023-04-16 DIAGNOSIS — F32.0 MAJOR DEPRESSIVE DISORDER, SINGLE EPISODE, MILD (H): ICD-10-CM

## 2023-04-17 RX ORDER — CITALOPRAM HYDROBROMIDE 20 MG/1
20 TABLET ORAL DAILY
Qty: 90 TABLET | Refills: 0 | Status: SHIPPED | OUTPATIENT
Start: 2023-04-17 | End: 2023-08-31

## 2023-04-17 RX ORDER — BUPROPION HYDROCHLORIDE 300 MG/1
300 TABLET ORAL EVERY MORNING
Qty: 90 TABLET | Refills: 0 | Status: SHIPPED | OUTPATIENT
Start: 2023-04-17 | End: 2023-08-22

## 2023-04-17 RX ORDER — LISINOPRIL 20 MG/1
TABLET ORAL
Qty: 90 TABLET | Refills: 0 | Status: SHIPPED | OUTPATIENT
Start: 2023-04-17 | End: 2023-08-31

## 2023-04-17 NOTE — TELEPHONE ENCOUNTER
Routing refill request to provider for review/approval because:  Labs not current:  PHQ9, BP, Creat  PHQ-9 score:        2/4/2022     6:37 AM   PHQ   PHQ-9 Total Score 2   Q9: Thoughts of better off dead/self-harm past 2 weeks Not at all     BP Readings from Last 3 Encounters:   02/04/22 114/72   09/14/21 120/70   08/23/21 120/70     Creatinine   Date Value Ref Range Status   04/24/2019 1.26 (H) 0.66 - 1.25 mg/dL Final      Patient needs to be seen because it has been more than 1 year since last office visit.  Routing due to the elevated Creat from last year.   Not allowed to send to multiple to multiple pools to schedule, please route to MA/TC team to help schedule.    Juvenal Kan RN on 4/17/2023 at 3:42 PM

## 2023-08-20 DIAGNOSIS — F32.0 MAJOR DEPRESSIVE DISORDER, SINGLE EPISODE, MILD (H): ICD-10-CM

## 2023-08-22 RX ORDER — BUPROPION HYDROCHLORIDE 300 MG/1
300 TABLET ORAL EVERY MORNING
Qty: 90 TABLET | Refills: 0 | Status: SHIPPED | OUTPATIENT
Start: 2023-08-22 | End: 2023-10-13

## 2023-08-22 NOTE — TELEPHONE ENCOUNTER
Medication is being filled for 1 time refill only due to:  Patient needs labs PHQ-9. Patient needs to be seen because it has been more than one year since last visit.    January refill sent. Routing to /[facility] to assist with getting pt scheduled.    Prescription approved per [facility] Refill Protocol.  Izabela DEAN RN

## 2023-08-28 DIAGNOSIS — I10 ESSENTIAL HYPERTENSION, BENIGN: ICD-10-CM

## 2023-08-28 DIAGNOSIS — F32.0 MAJOR DEPRESSIVE DISORDER, SINGLE EPISODE, MILD (H): ICD-10-CM

## 2023-08-31 RX ORDER — CITALOPRAM HYDROBROMIDE 20 MG/1
TABLET ORAL
Qty: 90 TABLET | Refills: 0 | Status: SHIPPED | OUTPATIENT
Start: 2023-08-31 | End: 2023-10-13

## 2023-08-31 RX ORDER — LISINOPRIL 20 MG/1
TABLET ORAL
Qty: 90 TABLET | Refills: 0 | Status: SHIPPED | OUTPATIENT
Start: 2023-08-31 | End: 2023-12-04

## 2023-08-31 NOTE — TELEPHONE ENCOUNTER
Routing refill request to provider for review/approval because:  Labs not current:  BMP  Patient needs to be seen because it has been more than 1 year since last office visit.  Failing PHQ9  Failing bp    Rosio Padilla RN         pulse oximetry

## 2023-10-13 ENCOUNTER — OFFICE VISIT (OUTPATIENT)
Dept: PEDIATRICS | Facility: CLINIC | Age: 43
End: 2023-10-13
Payer: COMMERCIAL

## 2023-10-13 VITALS
RESPIRATION RATE: 16 BRPM | TEMPERATURE: 97.9 F | HEART RATE: 81 BPM | DIASTOLIC BLOOD PRESSURE: 79 MMHG | SYSTOLIC BLOOD PRESSURE: 123 MMHG | WEIGHT: 236.6 LBS | HEIGHT: 77 IN | BODY MASS INDEX: 27.94 KG/M2

## 2023-10-13 DIAGNOSIS — G47.33 OSA (OBSTRUCTIVE SLEEP APNEA): ICD-10-CM

## 2023-10-13 DIAGNOSIS — K21.9 GASTROESOPHAGEAL REFLUX DISEASE WITHOUT ESOPHAGITIS: ICD-10-CM

## 2023-10-13 DIAGNOSIS — Z00.00 ROUTINE GENERAL MEDICAL EXAMINATION AT A HEALTH CARE FACILITY: Primary | ICD-10-CM

## 2023-10-13 DIAGNOSIS — T63.441S BEE STING REACTION, ACCIDENTAL OR UNINTENTIONAL, SEQUELA: ICD-10-CM

## 2023-10-13 DIAGNOSIS — G25.81 RESTLESS LEG SYNDROME: ICD-10-CM

## 2023-10-13 DIAGNOSIS — F32.0 MAJOR DEPRESSIVE DISORDER, SINGLE EPISODE, MILD (H): ICD-10-CM

## 2023-10-13 PROCEDURE — 99396 PREV VISIT EST AGE 40-64: CPT | Performed by: INTERNAL MEDICINE

## 2023-10-13 RX ORDER — ROPINIROLE 0.5 MG/1
0.5 TABLET, FILM COATED ORAL PRN
COMMUNITY
End: 2023-10-13

## 2023-10-13 RX ORDER — EPINEPHRINE 0.3 MG/.3ML
0.3 INJECTION SUBCUTANEOUS PRN
Qty: 2 EACH | Refills: 1 | Status: SHIPPED | OUTPATIENT
Start: 2023-10-13 | End: 2024-09-05

## 2023-10-13 RX ORDER — CITALOPRAM HYDROBROMIDE 40 MG/1
TABLET ORAL
Qty: 90 TABLET | Refills: 4 | Status: SHIPPED | OUTPATIENT
Start: 2023-10-13 | End: 2024-09-05

## 2023-10-13 RX ORDER — ROPINIROLE 0.5 MG/1
0.5 TABLET, FILM COATED ORAL PRN
Qty: 90 TABLET | Refills: 3 | Status: SHIPPED | OUTPATIENT
Start: 2023-10-13 | End: 2024-09-05

## 2023-10-13 RX ORDER — FAMOTIDINE 40 MG/1
40 TABLET, FILM COATED ORAL PRN
COMMUNITY
End: 2023-10-13

## 2023-10-13 RX ORDER — BUPROPION HYDROCHLORIDE 300 MG/1
300 TABLET ORAL EVERY MORNING
Qty: 90 TABLET | Refills: 4 | Status: SHIPPED | OUTPATIENT
Start: 2023-10-13 | End: 2024-09-05

## 2023-10-13 RX ORDER — FAMOTIDINE 40 MG/1
40 TABLET, FILM COATED ORAL PRN
Status: CANCELLED | OUTPATIENT
Start: 2023-10-13

## 2023-10-13 RX ORDER — FAMOTIDINE 40 MG/1
40 TABLET, FILM COATED ORAL PRN
Qty: 90 TABLET | Refills: 3 | Status: SHIPPED | OUTPATIENT
Start: 2023-10-13 | End: 2024-09-05

## 2023-10-13 RX ORDER — ROPINIROLE 0.5 MG/1
0.5 TABLET, FILM COATED ORAL PRN
Status: CANCELLED | OUTPATIENT
Start: 2023-10-13

## 2023-10-13 ASSESSMENT — ENCOUNTER SYMPTOMS
MYALGIAS: 0
HEMATURIA: 0
DYSURIA: 0
JOINT SWELLING: 0
SHORTNESS OF BREATH: 0
FEVER: 0
WEAKNESS: 0
CHILLS: 0
CONSTIPATION: 0
ARTHRALGIAS: 0
PALPITATIONS: 0
EYE PAIN: 0
HEMATOCHEZIA: 0
SORE THROAT: 0
HEADACHES: 0
ABDOMINAL PAIN: 0
FREQUENCY: 0
HEARTBURN: 0
DIARRHEA: 0
NERVOUS/ANXIOUS: 0
NAUSEA: 0
PARESTHESIAS: 0
COUGH: 0
DIZZINESS: 0

## 2023-10-13 ASSESSMENT — PAIN SCALES - GENERAL: PAINLEVEL: NO PAIN (0)

## 2023-10-13 ASSESSMENT — PATIENT HEALTH QUESTIONNAIRE - PHQ9
SUM OF ALL RESPONSES TO PHQ QUESTIONS 1-9: 4
SUM OF ALL RESPONSES TO PHQ QUESTIONS 1-9: 4
10. IF YOU CHECKED OFF ANY PROBLEMS, HOW DIFFICULT HAVE THESE PROBLEMS MADE IT FOR YOU TO DO YOUR WORK, TAKE CARE OF THINGS AT HOME, OR GET ALONG WITH OTHER PEOPLE: NOT DIFFICULT AT ALL

## 2023-10-13 NOTE — PATIENT INSTRUCTIONS
Preventive Health Recommendations    Yearly exam:             See your health care provider every year in order to  o   Review health changes.   o   Discuss preventive care.      Shots: Get a flu shot each year. Get a tetanus shot every 10 years.     Nutrition:  Eat at least 5 servings of fruits and vegetables daily.   Eat whole-grain bread, whole-wheat pasta and brown rice instead of white grains and rice.   Get adequate Calcium and Vitamin D.     Lifestyle  Exercise for at least 150 minutes a week (30 minutes a day, 5 days a week). This will help you control your weight and prevent disease.   Limit alcohol to one drink per day.   No smoking.   Wear sunscreen to prevent skin cancer.   See your dentist every six months for an exam and cleaning.

## 2023-10-13 NOTE — PROGRESS NOTES
SUBJECTIVE:   CC: Octavio is an 43 year old who presents for preventative health visit.       10/13/2023     2:20 PM   Additional Questions   Roomed by ARJUN Nieto         10/13/2023     2:20 PM   Patient Reported Additional Medications   Patient reports taking the following new medications NA       Healthy Habits:     Getting at least 3 servings of Calcium per day:  Yes    Bi-annual eye exam:  Yes    Dental care twice a year:  Yes    Sleep apnea or symptoms of sleep apnea:  Daytime drowsiness and Sleep apnea    Diet:  Regular (no restrictions)    Frequency of exercise:  None    Taking medications regularly:  Yes    Medication side effects:  Not applicable    Additional concerns today:  No      Answers submitted by the patient for this visit:  Patient Health Questionnaire (Submitted on 10/13/2023)  If you checked off any problems, how difficult have these problems made it for you to do your work, take care of things at home, or get along with other people?: Not difficult at all  PHQ9 TOTAL SCORE: 4      Today's PHQ-9 Score:       10/13/2023     2:02 PM   PHQ-9 SCORE   PHQ-9 Total Score MyChart 4 (Minimal depression)   PHQ-9 Total Score 4     Here for CPE.    Discussed reactions to bee stings. Stung 6 times this summer.  Develops widespread pruritus along with urticarial reaction at the sting site.  Antihistamines batista not help pruritus. This can last 6 weeks.    Depression. Mood seems to be doing fairly well, but he feels there is room for improvement. Reviewed medi doses. Could increase bupropion from 150 to 300 mg daily.     JOSÉ MIGUEL.     GERD. No dysphagia sx.    RLS. Has rx for ropinirole, working well.     Social History     Tobacco Use    Smoking status: Never     Passive exposure: Never    Smokeless tobacco: Never   Substance Use Topics    Alcohol use: Yes     Alcohol/week: 0.0 standard drinks of alcohol     Comment: socially           10/13/2023     2:05 PM   Alcohol Use   Prescreen: >3 drinks/day or >7 drinks/week?  "No       Reviewed orders with patient. Reviewed health maintenance and updated orders accordingly - Yes      Review of Systems   Constitutional:  Negative for chills and fever.   HENT:  Negative for congestion, ear pain, hearing loss and sore throat.    Eyes:  Negative for pain and visual disturbance.   Respiratory:  Negative for cough and shortness of breath.    Cardiovascular:  Negative for chest pain, palpitations and peripheral edema.   Gastrointestinal:  Negative for abdominal pain, constipation, diarrhea, heartburn, hematochezia and nausea.   Genitourinary:  Negative for dysuria, frequency, genital sores, hematuria, impotence, penile discharge and urgency.   Musculoskeletal:  Negative for arthralgias, joint swelling and myalgias.   Skin:  Negative for rash.   Neurological:  Negative for dizziness, weakness, headaches and paresthesias.   Psychiatric/Behavioral:  Negative for mood changes. The patient is not nervous/anxious.          OBJECTIVE:   /79 (BP Location: Right arm, Patient Position: Sitting, Cuff Size: Adult Large)   Pulse 81   Temp 97.9  F (36.6  C) (Oral)   Resp 16   Ht 1.943 m (6' 4.5\")   Wt 107.3 kg (236 lb 9.6 oz)   BMI 28.42 kg/m      Physical Exam  GENERAL: healthy, alert and no distress  EYES: PERRL, EOMI  HENT: ear canals and TM's normal. No nasal discharge. OP moist.  NECK: no adenopathy  RESP: lungs clear to auscultation - no rales, rhonchi or wheezes  CV: regular rate and rhythm, normal S1 S2, no murmur, no peripheral edema and peripheral pulses strong  ABDOMEN: soft, nontender, bowel sounds normal  MS: no gross musculoskeletal defects noted  SKIN: no suspicious lesions or rashes  NEURO: Normal strength and tone  PSYCH: mentation appears normal, affect normal/bright     ASSESSMENT/PLAN:       ICD-10-CM    1. Routine general medical examination at a health care facility  Z00.00 Basic metabolic panel     Lipid panel reflex to direct LDL Fasting      2. Major depressive disorder, " "single episode, mild (H24)  F32.0 buPROPion (WELLBUTRIN XL) 300 MG 24 hr tablet     citalopram (CELEXA) 40 MG tablet      3. Bee sting reaction, accidental or unintentional, sequela  T63.441S EPINEPHrine (ANY BX GENERIC EQUIV) 0.3 MG/0.3ML injection 2-pack     Adult Allergy/Asthma  Referral      4. JOSÉ MIGUEL (obstructive sleep apnea)  G47.33       5. Gastroesophageal reflux disease without esophagitis  K21.9 famotidine (PEPCID) 40 MG tablet      6. Restless leg syndrome  G25.81 rOPINIRole (REQUIP) 0.5 MG tablet        Overall doing well.  Increase bupropion to 300 mg daily.  Other routine meds refilled as needed.  Epi pen to have for severe bee sting reactions. Recommend allergy consultation as well since his job can put him in contact with bees.       COUNSELING:   Reviewed preventive health counseling, as reflected in patient instructions      BMI:   Estimated body mass index is 28.42 kg/m  as calculated from the following:    Height as of this encounter: 1.943 m (6' 4.5\").    Weight as of this encounter: 107.3 kg (236 lb 9.6 oz).   Weight management plan: Discussed healthy diet and exercise guidelines      He reports that he has never smoked. He has never been exposed to tobacco smoke. He has never used smokeless tobacco.          Francis Morales MD  [facility]  "

## 2023-12-02 DIAGNOSIS — I10 ESSENTIAL HYPERTENSION, BENIGN: ICD-10-CM

## 2023-12-04 RX ORDER — LISINOPRIL 20 MG/1
TABLET ORAL
Qty: 90 TABLET | Refills: 3 | Status: SHIPPED | OUTPATIENT
Start: 2023-12-04 | End: 2024-09-05

## 2024-03-20 ENCOUNTER — OFFICE VISIT (OUTPATIENT)
Dept: URGENT CARE | Facility: URGENT CARE | Age: 44
End: 2024-03-20
Payer: COMMERCIAL

## 2024-03-20 VITALS
WEIGHT: 230 LBS | BODY MASS INDEX: 27.63 KG/M2 | SYSTOLIC BLOOD PRESSURE: 128 MMHG | TEMPERATURE: 98.1 F | RESPIRATION RATE: 14 BRPM | DIASTOLIC BLOOD PRESSURE: 84 MMHG | HEART RATE: 74 BPM | OXYGEN SATURATION: 96 %

## 2024-03-20 DIAGNOSIS — J02.9 VIRAL PHARYNGITIS: Primary | ICD-10-CM

## 2024-03-20 DIAGNOSIS — R07.0 THROAT PAIN: ICD-10-CM

## 2024-03-20 LAB — DEPRECATED S PYO AG THROAT QL EIA: NEGATIVE

## 2024-03-20 PROCEDURE — 99213 OFFICE O/P EST LOW 20 MIN: CPT | Performed by: NURSE PRACTITIONER

## 2024-03-20 PROCEDURE — 87651 STREP A DNA AMP PROBE: CPT | Performed by: NURSE PRACTITIONER

## 2024-03-20 NOTE — PROGRESS NOTES
"Assessment & Plan      Diagnosis Comments   1. Viral pharyngitis  Drink plenty of fluids and rest.  May use salt water gargles- about 8 oz warm water with about 1 teaspoon salt  Sucrets and Cepacol spray are over the counter medications that numb the throat.  Over the counter pain relievers such as tylenol or ibuprofen may be used as needed.   Honey lemon tea helps to soothe the throat. \"Throat Coat\" tea is soothing as well.  If culture is positive please remember to change toothbrush after 24 hours of antibiotics (may soak in 3-6% hydrogen peroxide)  Will be contagious for 24 hours after starting antibiotic  May return to school//work/activities 24 hours after antibiotics are started.  Wash hands frequently and do not share beverages.  Please follow up with primary care provider if symptoms are not improving, worsening or new symptoms or for any adverse reactions to medications.         2. Throat pain  Streptococcus A Rapid Screen w/Reflex to PCR - Clinic Collect, Group A Streptococcus PCR Throat Swab Pending strep culture            ADARSH Cohen [facility]    Kartik Cunningham is a 43 year old male who presents to clinic today for the following health issues:  Chief Complaint   Patient presents with    Sick     sore throat, HA,  stomach ache  x 2-3 days -- took some dayquil and advil this AM     HPI    URI Adult    Onset of symptoms was 3 day(s) ago.  Course of illness is waxing and waning.    Severity moderate  Current and Associated symptoms: sore throat, headache, and nausea  Treatment measures tried include Tylenol/Ibuprofen, Fluids, and Rest.  Predisposing factors include ill contact: Work.      Review of Systems  Constitutional, HEENT, cardiovascular, pulmonary, gi and gu systems are negative, except as otherwise noted.      Objective    /84 (BP Location: Right arm, Patient Position: Chair, Cuff Size: Adult Large)   Pulse 74   Temp 98.1  F (36.7 "  C) (Oral)   Resp 14   Wt 104.3 kg (230 lb)   SpO2 96%   BMI 27.63 kg/m    Physical Exam   GENERAL: alert and no distress  EYES: Eyes grossly normal to inspection, PERRL and conjunctivae and sclerae normal  HENT: normal cephalic/atraumatic, ear canals and TM's normal, nose and mouth without ulcers or lesions, oropharynx clear, oral mucous membranes moist, and tonsillar erythema  NECK: bilateral anterior cervical adenopathy, no asymmetry, masses, or scars, and thyroid normal to palpation  RESP: lungs clear to auscultation - no rales, rhonchi or wheezes  CV: regular rate and rhythm, normal S1 S2, no S3 or S4, no murmur, click or rub, no peripheral edema  ABDOMEN: soft, nontender, no hepatosplenomegaly, no masses and bowel sounds normal  MS: no gross musculoskeletal defects noted, no edema  SKIN: no suspicious lesions or rashes    Results for orders placed or performed in visit on 03/20/24   Streptococcus A Rapid Screen w/Reflex to PCR - Clinic Collect     Status: Normal    Specimen: Throat; Swab   Result Value Ref Range    Group A Strep antigen Negative Negative

## 2024-03-21 LAB — GROUP A STREP BY PCR: NOT DETECTED

## 2024-03-21 NOTE — RESULT ENCOUNTER NOTE
Results discussed with patient in clinic. States understanding of these results.    Angelina Majano CNP

## 2024-05-02 ENCOUNTER — TRANSFERRED RECORDS (OUTPATIENT)
Dept: HEALTH INFORMATION MANAGEMENT | Facility: CLINIC | Age: 44
End: 2024-05-02
Payer: COMMERCIAL

## 2024-08-12 ENCOUNTER — TRANSFERRED RECORDS (OUTPATIENT)
Dept: HEALTH INFORMATION MANAGEMENT | Facility: CLINIC | Age: 44
End: 2024-08-12
Payer: COMMERCIAL

## 2024-09-05 DIAGNOSIS — I10 ESSENTIAL HYPERTENSION, BENIGN: ICD-10-CM

## 2024-09-05 DIAGNOSIS — F32.0 MAJOR DEPRESSIVE DISORDER, SINGLE EPISODE, MILD (H): ICD-10-CM

## 2024-09-05 DIAGNOSIS — T63.441S BEE STING REACTION, ACCIDENTAL OR UNINTENTIONAL, SEQUELA: ICD-10-CM

## 2024-09-05 DIAGNOSIS — G25.81 RESTLESS LEG SYNDROME: ICD-10-CM

## 2024-09-05 DIAGNOSIS — K21.9 GASTROESOPHAGEAL REFLUX DISEASE WITHOUT ESOPHAGITIS: ICD-10-CM

## 2024-09-06 RX ORDER — LISINOPRIL 20 MG/1
20 TABLET ORAL DAILY
Qty: 90 TABLET | Refills: 0 | Status: SHIPPED | OUTPATIENT
Start: 2024-09-06

## 2024-09-06 RX ORDER — BUPROPION HYDROCHLORIDE 300 MG/1
300 TABLET ORAL EVERY MORNING
Qty: 90 TABLET | Refills: 0 | Status: SHIPPED | OUTPATIENT
Start: 2024-09-06

## 2024-09-06 RX ORDER — ROPINIROLE 0.5 MG/1
0.5 TABLET, FILM COATED ORAL PRN
Qty: 90 TABLET | Refills: 0 | Status: SHIPPED | OUTPATIENT
Start: 2024-09-06 | End: 2024-09-10

## 2024-09-06 RX ORDER — FAMOTIDINE 40 MG/1
40 TABLET, FILM COATED ORAL PRN
Qty: 90 TABLET | Refills: 4 | Status: SHIPPED | OUTPATIENT
Start: 2024-09-06 | End: 2024-09-10

## 2024-09-06 RX ORDER — CITALOPRAM HYDROBROMIDE 40 MG/1
TABLET ORAL
Qty: 90 TABLET | Refills: 0 | Status: SHIPPED | OUTPATIENT
Start: 2024-09-06

## 2024-09-06 RX ORDER — EPINEPHRINE 0.3 MG/.3ML
0.3 INJECTION SUBCUTANEOUS PRN
Qty: 2 EACH | Refills: 1 | Status: SHIPPED | OUTPATIENT
Start: 2024-09-06

## 2024-09-07 DIAGNOSIS — I10 ESSENTIAL HYPERTENSION, BENIGN: ICD-10-CM

## 2024-09-09 ENCOUNTER — MYC REFILL (OUTPATIENT)
Dept: PEDIATRICS | Facility: CLINIC | Age: 44
End: 2024-09-09
Payer: COMMERCIAL

## 2024-09-09 DIAGNOSIS — F32.0 MAJOR DEPRESSIVE DISORDER, SINGLE EPISODE, MILD (H): ICD-10-CM

## 2024-09-09 DIAGNOSIS — I10 ESSENTIAL HYPERTENSION, BENIGN: ICD-10-CM

## 2024-09-09 RX ORDER — BUPROPION HYDROCHLORIDE 300 MG/1
300 TABLET ORAL EVERY MORNING
Qty: 90 TABLET | Refills: 0 | OUTPATIENT
Start: 2024-09-09

## 2024-09-09 RX ORDER — CITALOPRAM HYDROBROMIDE 40 MG/1
TABLET ORAL
Qty: 90 TABLET | Refills: 0 | OUTPATIENT
Start: 2024-09-09

## 2024-09-09 RX ORDER — LISINOPRIL 20 MG/1
20 TABLET ORAL DAILY
Qty: 90 TABLET | Refills: 0 | OUTPATIENT
Start: 2024-09-09

## 2024-09-10 ENCOUNTER — TELEPHONE (OUTPATIENT)
Dept: PEDIATRICS | Facility: CLINIC | Age: 44
End: 2024-09-10
Payer: COMMERCIAL

## 2024-09-10 DIAGNOSIS — G25.81 RESTLESS LEG SYNDROME: ICD-10-CM

## 2024-09-10 DIAGNOSIS — K21.9 GASTROESOPHAGEAL REFLUX DISEASE WITHOUT ESOPHAGITIS: ICD-10-CM

## 2024-09-10 RX ORDER — FAMOTIDINE 40 MG/1
40 TABLET, FILM COATED ORAL
Qty: 90 TABLET | Refills: 4 | Status: SHIPPED | OUTPATIENT
Start: 2024-09-10

## 2024-09-10 RX ORDER — ROPINIROLE 0.5 MG/1
0.5 TABLET, FILM COATED ORAL
Qty: 90 TABLET | Refills: 0 | Status: SHIPPED | OUTPATIENT
Start: 2024-09-10

## 2024-09-10 NOTE — TELEPHONE ENCOUNTER
"Haley from [facility] pharmacy called requesting clarification on a couple medications. Famotidine and Ropinirole are both needing a \"max number per day\" added to the prescription.    Haley stated that a new Rx with that added can be sent.     Manuela Cunningham on 9/10/2024 at 8:12 AM    "

## 2024-09-13 ENCOUNTER — PATIENT OUTREACH (OUTPATIENT)
Dept: CARE COORDINATION | Facility: CLINIC | Age: 44
End: 2024-09-13
Payer: COMMERCIAL

## 2024-09-27 ENCOUNTER — PATIENT OUTREACH (OUTPATIENT)
Dept: CARE COORDINATION | Facility: CLINIC | Age: 44
End: 2024-09-27
Payer: COMMERCIAL

## 2024-11-11 DIAGNOSIS — I10 ESSENTIAL HYPERTENSION, BENIGN: ICD-10-CM

## 2024-11-11 RX ORDER — LISINOPRIL 20 MG/1
20 TABLET ORAL DAILY
Qty: 90 TABLET | Refills: 0 | Status: SHIPPED | OUTPATIENT
Start: 2024-11-11

## 2024-11-14 DIAGNOSIS — F32.0 MAJOR DEPRESSIVE DISORDER, SINGLE EPISODE, MILD (H): ICD-10-CM

## 2024-11-14 RX ORDER — BUPROPION HYDROCHLORIDE 300 MG/1
TABLET ORAL
Qty: 30 TABLET | Refills: 0 | Status: SHIPPED | OUTPATIENT
Start: 2024-11-14

## 2024-11-14 NOTE — TELEPHONE ENCOUNTER
Note was added to last 90 day rx to schedule a visit. No visit scheduled.  Med refilled for 30.   Future can be filled for 15 if no visit scheduled.

## 2024-11-15 DIAGNOSIS — F32.0 MAJOR DEPRESSIVE DISORDER, SINGLE EPISODE, MILD (H): ICD-10-CM

## 2024-11-18 RX ORDER — CITALOPRAM HYDROBROMIDE 40 MG/1
TABLET ORAL
Qty: 30 TABLET | Refills: 0 | Status: SHIPPED | OUTPATIENT
Start: 2024-11-18

## 2024-12-22 ENCOUNTER — HEALTH MAINTENANCE LETTER (OUTPATIENT)
Age: 44
End: 2024-12-22

## 2024-12-27 DIAGNOSIS — F32.0 MAJOR DEPRESSIVE DISORDER, SINGLE EPISODE, MILD (H): ICD-10-CM

## 2024-12-28 RX ORDER — BUPROPION HYDROCHLORIDE 300 MG/1
TABLET ORAL
Qty: 15 TABLET | Refills: 0 | Status: SHIPPED | OUTPATIENT
Start: 2024-12-28

## 2025-01-22 ENCOUNTER — OFFICE VISIT (OUTPATIENT)
Dept: PEDIATRICS | Facility: CLINIC | Age: 45
End: 2025-01-22
Payer: COMMERCIAL

## 2025-01-22 VITALS
DIASTOLIC BLOOD PRESSURE: 70 MMHG | SYSTOLIC BLOOD PRESSURE: 114 MMHG | WEIGHT: 241.6 LBS | OXYGEN SATURATION: 98 % | HEART RATE: 81 BPM | HEIGHT: 77 IN | BODY MASS INDEX: 28.53 KG/M2 | TEMPERATURE: 97.8 F | RESPIRATION RATE: 16 BRPM

## 2025-01-22 DIAGNOSIS — Z00.00 ROUTINE GENERAL MEDICAL EXAMINATION AT A HEALTH CARE FACILITY: Primary | ICD-10-CM

## 2025-01-22 DIAGNOSIS — F32.0 MAJOR DEPRESSIVE DISORDER, SINGLE EPISODE, MILD: ICD-10-CM

## 2025-01-22 DIAGNOSIS — I10 ESSENTIAL HYPERTENSION: ICD-10-CM

## 2025-01-22 PROCEDURE — 80048 BASIC METABOLIC PNL TOTAL CA: CPT | Performed by: INTERNAL MEDICINE

## 2025-01-22 PROCEDURE — 80061 LIPID PANEL: CPT | Performed by: INTERNAL MEDICINE

## 2025-01-22 PROCEDURE — 36415 COLL VENOUS BLD VENIPUNCTURE: CPT | Performed by: INTERNAL MEDICINE

## 2025-01-22 PROCEDURE — 99396 PREV VISIT EST AGE 40-64: CPT | Performed by: INTERNAL MEDICINE

## 2025-01-22 RX ORDER — CITALOPRAM HYDROBROMIDE 40 MG/1
40 TABLET ORAL DAILY
Qty: 90 TABLET | Refills: 3 | Status: SHIPPED | OUTPATIENT
Start: 2025-01-22

## 2025-01-22 RX ORDER — LISINOPRIL 20 MG/1
20 TABLET ORAL DAILY
Qty: 90 TABLET | Refills: 0 | Status: SHIPPED | OUTPATIENT
Start: 2025-01-22

## 2025-01-22 RX ORDER — BUPROPION HYDROCHLORIDE 300 MG/1
300 TABLET ORAL EVERY MORNING
Qty: 90 TABLET | Refills: 3 | Status: SHIPPED | OUTPATIENT
Start: 2025-01-22

## 2025-01-22 SDOH — HEALTH STABILITY: PHYSICAL HEALTH: ON AVERAGE, HOW MANY DAYS PER WEEK DO YOU ENGAGE IN MODERATE TO STRENUOUS EXERCISE (LIKE A BRISK WALK)?: 3 DAYS

## 2025-01-22 ASSESSMENT — PATIENT HEALTH QUESTIONNAIRE - PHQ9
SUM OF ALL RESPONSES TO PHQ QUESTIONS 1-9: 3
SUM OF ALL RESPONSES TO PHQ QUESTIONS 1-9: 3

## 2025-01-22 ASSESSMENT — PAIN SCALES - GENERAL: PAINLEVEL_OUTOF10: NO PAIN (0)

## 2025-01-22 ASSESSMENT — SOCIAL DETERMINANTS OF HEALTH (SDOH): HOW OFTEN DO YOU GET TOGETHER WITH FRIENDS OR RELATIVES?: ONCE A WEEK

## 2025-01-22 NOTE — PROGRESS NOTES
"Preventive Care Visit  [facility]  Francis Morales MD, Internal Medicine - Pediatrics  Jan 22, 2025      Assessment & Plan        ICD-10-CM    1. Routine general medical examination at a health care facility  Z00.00 Lipid panel reflex to direct LDL Fasting     Lipid panel reflex to direct LDL Fasting      2. Essential hypertension  I10 BASIC METABOLIC PANEL     lisinopril (ZESTRIL) 20 MG tablet     BASIC METABOLIC PANEL      3. Major depressive disorder, single episode, mild  F32.0 buPROPion (WELLBUTRIN XL) 300 MG 24 hr tablet     citalopram (CELEXA) 40 MG tablet        Here for CPE. Routine HM reviewed. Fasting labwork ordered.    HTN. BP is controlled. Continue w/ Lisinopril.  Depression. Under good control. Medications refilled.       BMI  Estimated body mass index is 29.03 kg/m  as calculated from the following:    Height as of this encounter: 1.943 m (6' 4.5\").    Weight as of this encounter: 109.6 kg (241 lb 9.6 oz).   Weight management plan: Discussed healthy diet and exercise guidelines    Francis Morales MD       Kartik Cunningham is a 44 year old, presenting for the following:  Physical        1/22/2025     3:05 PM   Additional Questions   Roomed by Tita         1/22/2025     3:05 PM   Patient Reported Additional Medications   Patient reports taking the following new medications none          HPI    Here for CPE. Overall he is feeling well. No acute concerns today.    HTN. No cardiac sx such as CP, palpitations, PND, orthopnea, ALBA or peripheral edema.  BP Readings from Last 3 Encounters:   01/22/25 114/70   03/20/24 128/84   10/13/23 123/79     Depression. Mood is under good control. Would like to continue w/ current medications.      2/4/2022     6:37 AM 10/13/2023     2:02 PM 1/22/2025     3:03 PM   PHQ   PHQ-9 Total Score 2 4 3    Q9: Thoughts of better off dead/self-harm past 2 weeks Not at all Not at all Not at all       Patient-reported         Health Care Directive  Patient does " not have a Health Care Directive: Discussed advance care planning with patient; information given to patient to review.        1/22/2025   General Health   How would you rate your overall physical health? Good   Feel stress (tense, anxious, or unable to sleep) Not at all         1/22/2025   Nutrition   Three or more servings of calcium each day? Yes   Diet: Regular (no restrictions)   How many servings of fruit and vegetables per day? (!) 0-1   How many sweetened beverages each day? 0-1         1/22/2025   Exercise   Days per week of moderate/strenous exercise 3 days         1/22/2025   Social Factors   Frequency of gathering with friends or relatives Once a week   Worry food won't last until get money to buy more No   Food not last or not have enough money for food? No   Do you have housing? (Housing is defined as stable permanent housing and does not include staying ouside in a car, in a tent, in an abandoned building, in an overnight shelter, or couch-surfing.) Yes   Are you worried about losing your housing? No   Lack of transportation? No   Unable to get utilities (heat,electricity)? No         1/22/2025   Dental   Dentist two times every year? Yes         1/22/2025   TB Screening   Were you born outside of the [place]? No       Today's PHQ-9 Score:       1/22/2025     3:03 PM   PHQ-9 SCORE   PHQ-9 Total Score MyChart 3 (Minimal depression)   PHQ-9 Total Score 3        Patient-reported         1/22/2025   Substance Use   Alcohol more than 3/day or more than 7/wk No   Do you use any other substances recreationally? No     Social History     Tobacco Use    Smoking status: Never     Passive exposure: Never    Smokeless tobacco: Never   Vaping Use    Vaping status: Never Used   Substance Use Topics    Alcohol use: Yes     Alcohol/week: 0.0 standard drinks of alcohol     Comment: socially    Drug use: No           1/22/2025   STI Screening   New sexual partner(s) since last STI/HIV test? No   ASCVD Risk   The  "ASCVD Risk score (Lino FREY, et al., 2019) failed to calculate for the following reasons:    Cannot find a previous HDL lab    Cannot find a previous total cholesterol lab        1/22/2025   Contraception/Family Planning   Questions about contraception or family planning No       Objective    Exam  /70 (BP Location: Right arm, Patient Position: Sitting, Cuff Size: Adult Large)   Pulse 81   Temp 97.8  F (36.6  C) (Temporal)   Resp 16   Ht 1.943 m (6' 4.5\")   Wt 109.6 kg (241 lb 9.6 oz)   SpO2 98%   BMI 29.03 kg/m     Estimated body mass index is 29.03 kg/m  as calculated from the following:    Height as of this encounter: 1.943 m (6' 4.5\").    Weight as of this encounter: 109.6 kg (241 lb 9.6 oz).    Physical Exam  GENERAL: healthy, alert and no distress  EYES: PERRL, EOMI  HENT: ear canals and TM's normal. No nasal discharge. OP moist.  NECK: no adenopathy  RESP: lungs clear to auscultation - no rales, rhonchi or wheezes  CV: regular rate and rhythm, normal S1 S2, no murmur, no peripheral edema  ABDOMEN: soft, nontender, bowel sounds normal  MS: no gross musculoskeletal defects noted  SKIN: no suspicious lesions or rashes  NEURO: Normal strength and tone  PSYCH: mentation appears normal, affect normal         Signed Electronically by: Francis Morales MD    "

## 2025-01-23 LAB
ANION GAP SERPL CALCULATED.3IONS-SCNC: 14 MMOL/L (ref 7–15)
BUN SERPL-MCNC: 20 MG/DL (ref 6–20)
CALCIUM SERPL-MCNC: 9.8 MG/DL (ref 8.8–10.4)
CHLORIDE SERPL-SCNC: 98 MMOL/L (ref 98–107)
CHOLEST SERPL-MCNC: 225 MG/DL
CREAT SERPL-MCNC: 1.18 MG/DL (ref 0.67–1.17)
EGFRCR SERPLBLD CKD-EPI 2021: 78 ML/MIN/1.73M2
FASTING STATUS PATIENT QL REPORTED: YES
FASTING STATUS PATIENT QL REPORTED: YES
GLUCOSE SERPL-MCNC: 81 MG/DL (ref 70–99)
HCO3 SERPL-SCNC: 25 MMOL/L (ref 22–29)
HDLC SERPL-MCNC: 36 MG/DL
LDLC SERPL CALC-MCNC: 156 MG/DL
NONHDLC SERPL-MCNC: 189 MG/DL
POTASSIUM SERPL-SCNC: 4 MMOL/L (ref 3.4–5.3)
SODIUM SERPL-SCNC: 137 MMOL/L (ref 135–145)
TRIGL SERPL-MCNC: 166 MG/DL

## 2025-05-13 DIAGNOSIS — I10 ESSENTIAL HYPERTENSION: ICD-10-CM

## 2025-05-13 RX ORDER — LISINOPRIL 20 MG/1
20 TABLET ORAL DAILY
Qty: 90 TABLET | Refills: 0 | Status: SHIPPED | OUTPATIENT
Start: 2025-05-13

## 2025-05-13 NOTE — TELEPHONE ENCOUNTER
Prescription approved per [facility] Refill Protocol.  Amparo Aguilera RN  [facility]

## 2025-08-11 DIAGNOSIS — I10 ESSENTIAL HYPERTENSION: ICD-10-CM

## 2025-08-11 RX ORDER — LISINOPRIL 20 MG/1
20 TABLET ORAL DAILY
Qty: 90 TABLET | Refills: 1 | Status: SHIPPED | OUTPATIENT
Start: 2025-08-11